# Patient Record
Sex: FEMALE | Race: WHITE | NOT HISPANIC OR LATINO | Employment: OTHER | URBAN - METROPOLITAN AREA
[De-identification: names, ages, dates, MRNs, and addresses within clinical notes are randomized per-mention and may not be internally consistent; named-entity substitution may affect disease eponyms.]

---

## 2017-04-05 PROBLEM — Z78.0 POST-MENOPAUSAL: Status: ACTIVE | Noted: 2017-04-05

## 2017-04-05 PROBLEM — G40.909 SEIZURE DISORDER: Status: ACTIVE | Noted: 2017-04-05

## 2017-04-05 PROBLEM — E78.5 OTHER AND UNSPECIFIED HYPERLIPIDEMIA: Status: ACTIVE | Noted: 2017-04-05

## 2017-04-05 PROBLEM — K91.5 POST-CHOLECYSTECTOMY SYNDROME: Status: ACTIVE | Noted: 2017-04-05

## 2017-04-10 PROBLEM — I48.91 ATRIAL FIBRILLATION WITH RAPID VENTRICULAR RESPONSE: Status: ACTIVE | Noted: 2017-04-10

## 2017-04-11 PROBLEM — I48.0 PAROXYSMAL ATRIAL FIBRILLATION: Status: ACTIVE | Noted: 2017-04-11

## 2017-04-11 PROBLEM — I48.91 ATRIAL FIBRILLATION WITH RAPID VENTRICULAR RESPONSE: Status: RESOLVED | Noted: 2017-04-10 | Resolved: 2017-04-11

## 2017-10-13 PROBLEM — E78.49 OTHER HYPERLIPIDEMIA: Status: ACTIVE | Noted: 2017-04-05

## 2019-04-28 ENCOUNTER — OFFICE VISIT (OUTPATIENT)
Dept: URGENT CARE | Facility: CLINIC | Age: 74
End: 2019-04-28
Payer: MEDICARE

## 2019-04-28 VITALS
TEMPERATURE: 97 F | HEART RATE: 80 BPM | DIASTOLIC BLOOD PRESSURE: 69 MMHG | OXYGEN SATURATION: 95 % | SYSTOLIC BLOOD PRESSURE: 111 MMHG | RESPIRATION RATE: 14 BRPM

## 2019-04-28 DIAGNOSIS — R30.0 DYSURIA: ICD-10-CM

## 2019-04-28 DIAGNOSIS — N30.01 ACUTE CYSTITIS WITH HEMATURIA: Primary | ICD-10-CM

## 2019-04-28 LAB
BILIRUB UR QL STRIP: POSITIVE
GLUCOSE UR QL STRIP: NEGATIVE
KETONES UR QL STRIP: NEGATIVE
LEUKOCYTE ESTERASE UR QL STRIP: POSITIVE
PH, POC UA: 5 (ref 5–8)
POC BLOOD, URINE: POSITIVE
POC NITRATES, URINE: NEGATIVE
PROT UR QL STRIP: POSITIVE
SP GR UR STRIP: 1.02 (ref 1–1.03)
UROBILINOGEN UR STRIP-ACNC: ABNORMAL (ref 0.1–1.1)

## 2019-04-28 PROCEDURE — 99214 PR OFFICE/OUTPT VISIT, EST, LEVL IV, 30-39 MIN: ICD-10-PCS | Mod: 25,S$GLB,, | Performed by: PHYSICIAN ASSISTANT

## 2019-04-28 PROCEDURE — 81003 POCT URINALYSIS, DIPSTICK, AUTOMATED, W/O SCOPE: ICD-10-PCS | Mod: QW,S$GLB,, | Performed by: PHYSICIAN ASSISTANT

## 2019-04-28 PROCEDURE — 3078F PR MOST RECENT DIASTOLIC BLOOD PRESSURE < 80 MM HG: ICD-10-PCS | Mod: CPTII,S$GLB,, | Performed by: PHYSICIAN ASSISTANT

## 2019-04-28 PROCEDURE — 3074F SYST BP LT 130 MM HG: CPT | Mod: CPTII,S$GLB,, | Performed by: PHYSICIAN ASSISTANT

## 2019-04-28 PROCEDURE — 3074F PR MOST RECENT SYSTOLIC BLOOD PRESSURE < 130 MM HG: ICD-10-PCS | Mod: CPTII,S$GLB,, | Performed by: PHYSICIAN ASSISTANT

## 2019-04-28 PROCEDURE — 1101F PR PT FALLS ASSESS DOC 0-1 FALLS W/OUT INJ PAST YR: ICD-10-PCS | Mod: CPTII,S$GLB,, | Performed by: PHYSICIAN ASSISTANT

## 2019-04-28 PROCEDURE — 1101F PT FALLS ASSESS-DOCD LE1/YR: CPT | Mod: CPTII,S$GLB,, | Performed by: PHYSICIAN ASSISTANT

## 2019-04-28 PROCEDURE — 81003 URINALYSIS AUTO W/O SCOPE: CPT | Mod: QW,S$GLB,, | Performed by: PHYSICIAN ASSISTANT

## 2019-04-28 PROCEDURE — 99214 OFFICE O/P EST MOD 30 MIN: CPT | Mod: 25,S$GLB,, | Performed by: PHYSICIAN ASSISTANT

## 2019-04-28 PROCEDURE — 3078F DIAST BP <80 MM HG: CPT | Mod: CPTII,S$GLB,, | Performed by: PHYSICIAN ASSISTANT

## 2019-04-28 RX ORDER — SULFAMETHOXAZOLE AND TRIMETHOPRIM 800; 160 MG/1; MG/1
1 TABLET ORAL 2 TIMES DAILY
Qty: 20 TABLET | Refills: 0 | Status: SHIPPED | OUTPATIENT
Start: 2019-04-28 | End: 2019-05-08

## 2019-04-28 RX ORDER — PHENAZOPYRIDINE HYDROCHLORIDE 200 MG/1
200 TABLET, FILM COATED ORAL 3 TIMES DAILY PRN
Qty: 30 TABLET | Refills: 0 | Status: SHIPPED | OUTPATIENT
Start: 2019-04-28 | End: 2019-08-09

## 2019-04-28 NOTE — PROGRESS NOTES
Subjective:       Patient ID: Samina Sprague is a 73 y.o. female.    Vitals:  oral temperature is 97.1 °F (36.2 °C). Her blood pressure is 111/69 and her pulse is 80. Her respiration is 14 and oxygen saturation is 95%.     Chief Complaint: Dysuria    Pt c/o dysuria, 3 days, urgency and frequency, decreased urine output, suprapubic pain, chills at night,  taking azo with no relief, pt stated she was taking Bactrim for ingrown toenail, last dose was 1 week prior to onset of symptoms,     Dysuria    This is a new problem. The current episode started in the past 7 days. The problem has been unchanged. Associated symptoms include frequency and urgency. Pertinent negatives include no chills, hematuria, nausea, vomiting or rash. Treatments tried: AZO. The treatment provided no relief.       Constitution: Negative for chills and fever.   Neck: Negative for painful lymph nodes.   Gastrointestinal: Negative for abdominal pain, nausea and vomiting.   Genitourinary: Positive for dysuria, frequency, urgency and urine decreased. Negative for hematuria, history of kidney stones, painful menstruation, irregular menstruation, missed menses, heavy menstrual bleeding, ovarian cysts, genital trauma, vaginal pain, vaginal discharge, vaginal bleeding, vaginal odor, painful intercourse, genital sore, painful ejaculation and pelvic pain.   Musculoskeletal: Negative for back pain.   Skin: Negative for rash and lesion.   Hematologic/Lymphatic: Negative for swollen lymph nodes.       Objective:      Physical Exam   Constitutional: She is oriented to person, place, and time. She appears well-developed and well-nourished.   HENT:   Head: Normocephalic and atraumatic.   Right Ear: External ear normal.   Left Ear: External ear normal.   Nose: Nose normal. No nasal deformity. No epistaxis.   Mouth/Throat: Oropharynx is clear and moist and mucous membranes are normal.   Eyes: Conjunctivae and lids are normal.   Neck: Trachea normal, normal range of  "motion and phonation normal. Neck supple.   Cardiovascular: Normal rate, regular rhythm, normal heart sounds and normal pulses.   Pulmonary/Chest: Effort normal and breath sounds normal.   Abdominal: Soft. Normal appearance and bowel sounds are normal. She exhibits no distension and no mass. There is no tenderness. There is no rigidity, no rebound, no guarding, no CVA tenderness, no tenderness at McBurney's point and negative Driscoll's sign.       Neurological: She is alert and oriented to person, place, and time.   Skin: Skin is warm, dry and intact.   Psychiatric: She has a normal mood and affect. Her speech is normal and behavior is normal. Cognition and memory are normal.   Nursing note and vitals reviewed.      Assessment:       1. Acute cystitis with hematuria    2. Dysuria        Plan:         Acute cystitis with hematuria  -     sulfamethoxazole-trimethoprim 800-160mg (BACTRIM DS) 800-160 mg Tab; Take 1 tablet by mouth 2 (two) times daily. Take with food, as directed until bottle is empty for 10 days  Dispense: 20 tablet; Refill: 0  -     phenazopyridine (PYRIDIUM) 200 MG tablet; Take 1 tablet (200 mg total) by mouth 3 (three) times daily as needed for Pain.  Dispense: 30 tablet; Refill: 0  -     Culture, Urine    Dysuria  -     POCT Urinalysis, Dipstick, Automated, W/O Scope      Results for orders placed or performed in visit on 04/28/19   POCT Urinalysis, Dipstick, Automated, W/O Scope   Result Value Ref Range    POC Blood, Urine Positive (A) Negative    POC Bilirubin, Urine Positive (A) Negative    POC Urobilinogen, Urine n 0.1 - 1.1    POC Ketones, Urine Negative Negative    POC Protein, Urine Positive (A) Negative    POC Nitrates, Urine Negative Negative    POC Glucose, Urine Negative Negative    pH, UA 5.0 5 - 8    POC Specific Gravity, Urine 1.025 1.003 - 1.029    POC Leukocytes, Urine Positive (A) Negative        Patient Instructions   UTI  A bladder infection ("cystitis" or "UTI") usually causes a " constant urge to urinate and a burning when passing urine. Urine may be cloudy, smelly or dark. There may be pain in the lower abdomen. A bladder infection occurs when bacteria from the vaginal area enter the bladder opening (urethra). This can occur from sexual intercourse, wearing tight clothing, dehydration and other factors.    Cystitis in males is not common. It may be caused by a partial blockage in the urinary system that keeps the bladder from emptying completely. This is most often related to an enlarged prostate gland.      HOME CARE:  1. Drink lots of fluids (at least 6-8 glasses a day, unless you must restrict fluids for other medical reasons). This will force the medicine into your urinary system and flush the bacteria out of your body.  2. Avoid sexual intercourse until your symptoms are gone.  3. Avoid caffeine, alcohol and spicy foods. These can irritate the bladder.  4. A bladder infection is treated with antibiotics. You may also be given Pyridium (generic = phenazopyridine) to reduce the burning sensation. This medicine will cause your urine to become a bright orange color. The orange urine may stain clothing. You may wear a pad or panty-liner to protect clothing.    PREVENTING FUTURE INFECTIONS:  1. Always wipe from front to back after a bowel movement.  2. Keep the genital area clean and dry.  3. Drink plenty of fluids each day to avoid dehydration.  4. Both sexual partners should wash before intercourse.  5. Urinate right after intercourse to flush out the bladder.  6. Wear cotton underwear and cotton-lined panty hose; avoid tight-fitting pants.  7. If you are on birth control pills and are having frequent bladder infections, discuss with your doctor.    FOLLOW UP: Return to this facility or see your doctor if ALL symptoms are not gone after three days of treatment.    GET PROMPT MEDICAL ATTENTION if any of the following occur:  Fever over 100.0ºF (37.8ºC)  No improvement by the third day of  treatment  Increasing back or abdominal pain  Repeated vomiting; unable to keep medicine down  Weakness, dizziness or fainting  Vaginal discharge  Pain, redness or swelling in the labia (outer vaginal area)       If not allergic,take tylenol (acetominophen) for fever control, chills, or body aches every 4 hours. Do not exceed 4000 mg/ day.If not allergic, take Motrin (Ibuprofen) every 4 hours for fever, chills, pain or inflammation. Do not exceed 2400 mg/day. You can alternate taking tylenol and motrin.  If you were prescribed a narcotic medication, do not drive or operate heavy equipment or machinery while taking these medications.  You must understand that you've received an Urgent Care treatment only and that you may be released before all your medical problems are known or treated. You, the patient, will arrange for follow up care as instructed.  Follow up with your PCP or specialty clinic as directed in the next 1-2 weeks if not improved or as needed.  You can call [phone] to schedule an appointment with the appropriate provider.  If your condition worsens we recommend that you receive another evaluation at the emergency room immediately or contact your primary medical clinics after hours call service to discuss your concerns.  Please return here or go to the Emergency Department for any concerns or worsening of condition.

## 2019-04-28 NOTE — PATIENT INSTRUCTIONS
"UTI  A bladder infection ("cystitis" or "UTI") usually causes a constant urge to urinate and a burning when passing urine. Urine may be cloudy, smelly or dark. There may be pain in the lower abdomen. A bladder infection occurs when bacteria from the vaginal area enter the bladder opening (urethra). This can occur from sexual intercourse, wearing tight clothing, dehydration and other factors.    Cystitis in males is not common. It may be caused by a partial blockage in the urinary system that keeps the bladder from emptying completely. This is most often related to an enlarged prostate gland.      HOME CARE:  1. Drink lots of fluids (at least 6-8 glasses a day, unless you must restrict fluids for other medical reasons). This will force the medicine into your urinary system and flush the bacteria out of your body.  2. Avoid sexual intercourse until your symptoms are gone.  3. Avoid caffeine, alcohol and spicy foods. These can irritate the bladder.  4. A bladder infection is treated with antibiotics. You may also be given Pyridium (generic = phenazopyridine) to reduce the burning sensation. This medicine will cause your urine to become a bright orange color. The orange urine may stain clothing. You may wear a pad or panty-liner to protect clothing.    PREVENTING FUTURE INFECTIONS:  1. Always wipe from front to back after a bowel movement.  2. Keep the genital area clean and dry.  3. Drink plenty of fluids each day to avoid dehydration.  4. Both sexual partners should wash before intercourse.  5. Urinate right after intercourse to flush out the bladder.  6. Wear cotton underwear and cotton-lined panty hose; avoid tight-fitting pants.  7. If you are on birth control pills and are having frequent bladder infections, discuss with your doctor.    FOLLOW UP: Return to this facility or see your doctor if ALL symptoms are not gone after three days of treatment.    GET PROMPT MEDICAL ATTENTION if any of the following " occur:  Fever over 100.0ºF (37.8ºC)  No improvement by the third day of treatment  Increasing back or abdominal pain  Repeated vomiting; unable to keep medicine down  Weakness, dizziness or fainting  Vaginal discharge  Pain, redness or swelling in the labia (outer vaginal area)       If not allergic,take tylenol (acetominophen) for fever control, chills, or body aches every 4 hours. Do not exceed 4000 mg/ day.If not allergic, take Motrin (Ibuprofen) every 4 hours for fever, chills, pain or inflammation. Do not exceed 2400 mg/day. You can alternate taking tylenol and motrin.  If you were prescribed a narcotic medication, do not drive or operate heavy equipment or machinery while taking these medications.  You must understand that you've received an Urgent Care treatment only and that you may be released before all your medical problems are known or treated. You, the patient, will arrange for follow up care as instructed.  Follow up with your PCP or specialty clinic as directed in the next 1-2 weeks if not improved or as needed.  You can call [phone] to schedule an appointment with the appropriate provider.  If your condition worsens we recommend that you receive another evaluation at the emergency room immediately or contact your primary medical clinics after hours call service to discuss your concerns.  Please return here or go to the Emergency Department for any concerns or worsening of condition.

## 2019-05-03 ENCOUNTER — TELEPHONE (OUTPATIENT)
Dept: URGENT CARE | Facility: CLINIC | Age: 74
End: 2019-05-03

## 2019-05-03 LAB
BACTERIA UR CULT: ABNORMAL
BACTERIA UR CULT: ABNORMAL
OTHER ANTIBIOTIC SUSC ISLT: ABNORMAL

## 2019-05-03 NOTE — TELEPHONE ENCOUNTER
Spoke to pt and she informed me that she is still taking her medication and starting to feel better.

## 2019-05-03 NOTE — TELEPHONE ENCOUNTER
----- Message from Noah Pantoja MD sent at 5/3/2019  8:24 AM CDT -----  Urine culture is positive for infection.  The antibiotic You replaced on should be adequate to treat this infection.  If her symptoms do not resolve, or return after treatment you should follow up with your PCP or specialist.

## 2019-08-08 PROBLEM — Z78.9 STATIN INTOLERANCE: Status: ACTIVE | Noted: 2019-08-08

## 2021-01-22 ENCOUNTER — PATIENT MESSAGE (OUTPATIENT)
Dept: ADMINISTRATIVE | Facility: OTHER | Age: 76
End: 2021-01-22

## 2021-02-21 ENCOUNTER — OFFICE VISIT (OUTPATIENT)
Dept: URGENT CARE | Facility: CLINIC | Age: 76
End: 2021-02-21
Payer: MEDICARE

## 2021-02-21 VITALS
HEART RATE: 91 BPM | OXYGEN SATURATION: 97 % | TEMPERATURE: 98 F | WEIGHT: 152 LBS | SYSTOLIC BLOOD PRESSURE: 120 MMHG | BODY MASS INDEX: 27.97 KG/M2 | DIASTOLIC BLOOD PRESSURE: 83 MMHG | HEIGHT: 62 IN | RESPIRATION RATE: 16 BRPM

## 2021-02-21 DIAGNOSIS — J02.9 SORE THROAT: ICD-10-CM

## 2021-02-21 DIAGNOSIS — H92.02 EAR PAIN, LEFT: Primary | ICD-10-CM

## 2021-02-21 LAB
CTP QC/QA: YES
MOLECULAR STREP A: NEGATIVE

## 2021-02-21 PROCEDURE — 99213 OFFICE O/P EST LOW 20 MIN: CPT | Mod: S$GLB,,, | Performed by: NURSE PRACTITIONER

## 2021-02-21 PROCEDURE — 3008F PR BODY MASS INDEX (BMI) DOCUMENTED: ICD-10-PCS | Mod: CPTII,S$GLB,, | Performed by: NURSE PRACTITIONER

## 2021-02-21 PROCEDURE — 87651 POCT STREP A MOLECULAR: ICD-10-PCS | Mod: QW,S$GLB,, | Performed by: NURSE PRACTITIONER

## 2021-02-21 PROCEDURE — 99213 PR OFFICE/OUTPT VISIT, EST, LEVL III, 20-29 MIN: ICD-10-PCS | Mod: S$GLB,,, | Performed by: NURSE PRACTITIONER

## 2021-02-21 PROCEDURE — 87651 STREP A DNA AMP PROBE: CPT | Mod: QW,S$GLB,, | Performed by: NURSE PRACTITIONER

## 2021-02-21 PROCEDURE — 3008F BODY MASS INDEX DOCD: CPT | Mod: CPTII,S$GLB,, | Performed by: NURSE PRACTITIONER

## 2021-02-21 RX ORDER — FLUTICASONE PROPIONATE 50 MCG
1 SPRAY, SUSPENSION (ML) NASAL DAILY
Qty: 9.9 ML | Refills: 0 | Status: SHIPPED | OUTPATIENT
Start: 2021-02-21 | End: 2022-05-02

## 2021-09-17 PROBLEM — E03.4 HYPOTHYROIDISM DUE TO ACQUIRED ATROPHY OF THYROID: Status: ACTIVE | Noted: 2021-09-17

## 2022-06-18 PROBLEM — Z86.79 S/P ABLATION OF ATRIAL FIBRILLATION: Status: ACTIVE | Noted: 2022-06-18

## 2022-06-18 PROBLEM — Z98.890 S/P ABLATION OF ATRIAL FIBRILLATION: Status: ACTIVE | Noted: 2022-06-18

## 2022-09-29 PROBLEM — Z95.818 STATUS POST PLACEMENT OF IMPLANTABLE LOOP RECORDER: Status: ACTIVE | Noted: 2022-09-29

## 2022-11-28 ENCOUNTER — CLINICAL SUPPORT (OUTPATIENT)
Dept: AUDIOLOGY | Facility: CLINIC | Age: 77
End: 2022-11-28
Payer: MEDICARE

## 2022-11-28 ENCOUNTER — OFFICE VISIT (OUTPATIENT)
Dept: OTOLARYNGOLOGY | Facility: CLINIC | Age: 77
End: 2022-11-28
Payer: MEDICARE

## 2022-11-28 VITALS — HEIGHT: 62 IN | WEIGHT: 147.69 LBS | BODY MASS INDEX: 27.18 KG/M2 | TEMPERATURE: 99 F

## 2022-11-28 DIAGNOSIS — H69.03 PATULOUS EUSTACHIAN TUBE OF BOTH EARS: ICD-10-CM

## 2022-11-28 DIAGNOSIS — H90.3 ASYMMETRIC SNHL (SENSORINEURAL HEARING LOSS): Primary | ICD-10-CM

## 2022-11-28 DIAGNOSIS — H90.3 BILATERAL SENSORINEURAL HEARING LOSS: Primary | ICD-10-CM

## 2022-11-28 DIAGNOSIS — H93.292 IMPAIRED AUDITORY DISCRIMINATION, LEFT: ICD-10-CM

## 2022-11-28 DIAGNOSIS — H92.09 OTALGIA, UNSPECIFIED LATERALITY: ICD-10-CM

## 2022-11-28 DIAGNOSIS — H93.13 BILATERAL TINNITUS: ICD-10-CM

## 2022-11-28 DIAGNOSIS — H93.13 TINNITUS OF BOTH EARS: ICD-10-CM

## 2022-11-28 DIAGNOSIS — Z96.22 PATENT TYMPANOSTOMY TUBE: ICD-10-CM

## 2022-11-28 PROCEDURE — 92557 PR COMPREHENSIVE HEARING TEST: ICD-10-PCS | Mod: S$GLB,,, | Performed by: AUDIOLOGIST

## 2022-11-28 PROCEDURE — 1160F PR REVIEW ALL MEDS BY PRESCRIBER/CLIN PHARMACIST DOCUMENTED: ICD-10-PCS | Mod: CPTII,S$GLB,, | Performed by: NURSE PRACTITIONER

## 2022-11-28 PROCEDURE — 99214 OFFICE O/P EST MOD 30 MIN: CPT | Mod: S$GLB,,, | Performed by: NURSE PRACTITIONER

## 2022-11-28 PROCEDURE — 99999 PR PBB SHADOW E&M-EST. PATIENT-LVL IV: CPT | Mod: PBBFAC,,, | Performed by: NURSE PRACTITIONER

## 2022-11-28 PROCEDURE — 1125F AMNT PAIN NOTED PAIN PRSNT: CPT | Mod: CPTII,S$GLB,, | Performed by: NURSE PRACTITIONER

## 2022-11-28 PROCEDURE — 3288F FALL RISK ASSESSMENT DOCD: CPT | Mod: CPTII,S$GLB,, | Performed by: NURSE PRACTITIONER

## 2022-11-28 PROCEDURE — 92567 PR TYMPA2METRY: ICD-10-PCS | Mod: S$GLB,,, | Performed by: AUDIOLOGIST

## 2022-11-28 PROCEDURE — 92557 COMPREHENSIVE HEARING TEST: CPT | Mod: S$GLB,,, | Performed by: AUDIOLOGIST

## 2022-11-28 PROCEDURE — 92567 TYMPANOMETRY: CPT | Mod: S$GLB,,, | Performed by: AUDIOLOGIST

## 2022-11-28 PROCEDURE — 99214 PR OFFICE/OUTPT VISIT, EST, LEVL IV, 30-39 MIN: ICD-10-PCS | Mod: S$GLB,,, | Performed by: NURSE PRACTITIONER

## 2022-11-28 PROCEDURE — 1125F PR PAIN SEVERITY QUANTIFIED, PAIN PRESENT: ICD-10-PCS | Mod: CPTII,S$GLB,, | Performed by: NURSE PRACTITIONER

## 2022-11-28 PROCEDURE — 1159F PR MEDICATION LIST DOCUMENTED IN MEDICAL RECORD: ICD-10-PCS | Mod: CPTII,S$GLB,, | Performed by: NURSE PRACTITIONER

## 2022-11-28 PROCEDURE — 1160F RVW MEDS BY RX/DR IN RCRD: CPT | Mod: CPTII,S$GLB,, | Performed by: NURSE PRACTITIONER

## 2022-11-28 PROCEDURE — 1159F MED LIST DOCD IN RCRD: CPT | Mod: CPTII,S$GLB,, | Performed by: NURSE PRACTITIONER

## 2022-11-28 PROCEDURE — 3288F PR FALLS RISK ASSESSMENT DOCUMENTED: ICD-10-PCS | Mod: CPTII,S$GLB,, | Performed by: NURSE PRACTITIONER

## 2022-11-28 PROCEDURE — 1101F PR PT FALLS ASSESS DOC 0-1 FALLS W/OUT INJ PAST YR: ICD-10-PCS | Mod: CPTII,S$GLB,, | Performed by: NURSE PRACTITIONER

## 2022-11-28 PROCEDURE — 1101F PT FALLS ASSESS-DOCD LE1/YR: CPT | Mod: CPTII,S$GLB,, | Performed by: NURSE PRACTITIONER

## 2022-11-28 PROCEDURE — 99999 PR PBB SHADOW E&M-EST. PATIENT-LVL IV: ICD-10-PCS | Mod: PBBFAC,,, | Performed by: NURSE PRACTITIONER

## 2022-11-28 NOTE — PATIENT INSTRUCTIONS
Tinnitus (Ringing in the Ears)  Tinnitus is the term for a noise in your ear not caused by an outside sound. The noise might be a ringing, buzzing, hissing, roaring. It can vary in pitch and may be soft or quite loud. For some people, tinnitus is a minor nuisance. But for others, the noise can make it hard to hear, work, and even sleep. When tinnitus can't be cured, a number of treatments may offer relief.  What causes tinnitus?  Loud noises, hearing loss, and ear wax can cause tinnitus. So can certain medicines. Large amounts of aspirin or caffeine are sometimes to blame. In many cases, the exact cause of tinnitus is unknown.  How is tinnitus treated?  Identifying and removing the cause is the best way to treat tinnitus. For that reason, your healthcare provider may refer you to an otolaryngologist (ear, nose, and throat doctor). Your hearing may also be checked by an audiologist (hearing specialist). If you have hearing loss, wearing a hearing aid may help your tinnitus. When the cause can't be found, the tinnitus itself may be treated. Some of the treatments are listed below, and your healthcare provider can tell you more about them:  Avoid exposure to loud sounds, which will exacerbate tinnitus.  Wear ear protection around loud noises.  Get your blood pressure check regularly.  If it is high, see your doctor.  Check with your PCP whether labs can be done to check for anemia and hyperthyroid, as these can worsen tinnitus.   Decrease salt intake.  Avoid stimulants such as caffeine and tobacco.  Exercise daily to improve circulation. Poor circulation worsens tinnitus.   Avoid sleep deprivation. Sleep deprivation and insomnia can worsen tinnitus. Get adequate rest.  Reduce aspirin use, if possible. Aspirin as well as NSAIDs and narcotic pain relievers can exacerbate tinnitus.   Stop worrying about the noise.  Stress worsens tinnitus. Recognize the noise as an annoyance and learn to ignore it as much as possible.  Patients with higher rates of anxiety, depression, concentration, or problems sleeping may need to discuss taking something for anxiety or depression with their primary care provider.     Consider a trial of lipoflavonoids, slow-release niacin, or Arches' Tinnitus Formula (over-the-counter).  Purchase a white noise machine to mask tinnitus.  The i[employer] Tinnitus Relief ari on your smart phone uses a combination of sounds and relaxing exercises that aim to distract your brain from focusing on tinnitus. Over time the brain learns to focus less on the tinnitus.   When no underlying pathology can be identified, an audiogram is needed to screen for hearing loss. If hearing loss is noted, hearing aids with masking technology are recommended to help relieve tinnitus.   Maskers are small devices that look like hearing aids. They emit a pleasant sound that helps cover up the ringing in your ears, similar to the technology used in noise-cancelling headphones. Hearing aids and maskers are sometimes used together.  Cognitive Behavioral Therapy (CBT), otherwise known as Tinnitus Retraining Therapy (TRT), can be done by either an audiologist or a cognitive behavioral therapist who is certified in TRT. Tinnitus retraining therapy combines biofeedback, counseling and maskers.   Medicines that treat anxiety and depression may ease tinnitus in some people.  Hypnosis or relaxation therapy may help noise seem less severe.    First-line treatment for tinnitus:  Elimination of exacerbating factors such as elevated blood pressure, high sodium intake, caffeine/stimulants, sleep deprivation/insomnia, certain medications, aspirin, exposure to loud sounds, etc. White noise is recommended as first-line treatment.    Second-line treatment for tinnitus:  Maskers (with or without the use of a hearing aid depending on the outcome of your hearing test) and/or Cognitive Behavior Therapy (Tinnitus Retraining Therapy).  To find an audiologist or  Cognitive Behavioral Therapist in your area who is certified in Tinnitus Retraining Therapy, you must contact the American Tinnitus Association at 1-691.608.5795 or www.lambert.org.    For more information  American Speech-Hearing-Language Association [phone] www.rigoberto.org  American Tinnitus Association [phone] www.lambert.org  National [employer] on Deafness and other Communication Disorders [phone] www.nidcd.nih.gov

## 2022-11-28 NOTE — PROGRESS NOTES
Samina Sprague was seen 11/28/2022 for an audiological evaluation.     Pt reported abnormal auditory sound of her own voice, bilateral tinnitus, difficulty hearing speech clearly and intermittent ear/jaw pain.     Otoscopy revealed clear view of both external ear canals and tympanic membranes.  No obstructive cerumen present in either ear canal.   PE tube visualized in left TM.     Audiogram results revealed a mild-to-moderate sensorineural hearing loss for the right ear and a mild-to-severe sensorineural hearing loss for the left ear.    Speech Reception Thresholds were  20 dBHL for the right ear and 35 dBHL for the left ear.    Word recognition scores were excellent for the right ear and fair for the left ear.   Tympanograms were Type A for the right ear and unable to obtain for the left ear.     Audiogram results were reviewed in detail with patient and all questions were answered. Results will be reviewed by ENT at the completion of this note.     Recommend ENT consult due to asymmetry, binaural amplification pending medical clearance, hearing protection for all loud sounds and annual audiogram to monitor hearing loss.

## 2023-01-24 ENCOUNTER — OFFICE VISIT (OUTPATIENT)
Dept: OTOLARYNGOLOGY | Facility: CLINIC | Age: 78
End: 2023-01-24
Payer: MEDICARE

## 2023-01-24 VITALS — BODY MASS INDEX: 27.95 KG/M2 | WEIGHT: 151.88 LBS | HEIGHT: 62 IN

## 2023-01-24 DIAGNOSIS — H90.3 BILATERAL SENSORINEURAL HEARING LOSS: ICD-10-CM

## 2023-01-24 DIAGNOSIS — H93.13 TINNITUS OF BOTH EARS: ICD-10-CM

## 2023-01-24 DIAGNOSIS — H69.03 PATULOUS EUSTACHIAN TUBE OF BOTH EARS: Primary | ICD-10-CM

## 2023-01-24 PROCEDURE — 1101F PT FALLS ASSESS-DOCD LE1/YR: CPT | Mod: CPTII,S$GLB,, | Performed by: OTOLARYNGOLOGY

## 2023-01-24 PROCEDURE — 1159F PR MEDICATION LIST DOCUMENTED IN MEDICAL RECORD: ICD-10-PCS | Mod: CPTII,S$GLB,, | Performed by: OTOLARYNGOLOGY

## 2023-01-24 PROCEDURE — 1126F PR PAIN SEVERITY QUANTIFIED, NO PAIN PRESENT: ICD-10-PCS | Mod: CPTII,S$GLB,, | Performed by: OTOLARYNGOLOGY

## 2023-01-24 PROCEDURE — 1159F MED LIST DOCD IN RCRD: CPT | Mod: CPTII,S$GLB,, | Performed by: OTOLARYNGOLOGY

## 2023-01-24 PROCEDURE — 99999 PR PBB SHADOW E&M-EST. PATIENT-LVL III: ICD-10-PCS | Mod: PBBFAC,,, | Performed by: OTOLARYNGOLOGY

## 2023-01-24 PROCEDURE — 1101F PR PT FALLS ASSESS DOC 0-1 FALLS W/OUT INJ PAST YR: ICD-10-PCS | Mod: CPTII,S$GLB,, | Performed by: OTOLARYNGOLOGY

## 2023-01-24 PROCEDURE — 99214 PR OFFICE/OUTPT VISIT, EST, LEVL IV, 30-39 MIN: ICD-10-PCS | Mod: S$GLB,,, | Performed by: OTOLARYNGOLOGY

## 2023-01-24 PROCEDURE — 99214 OFFICE O/P EST MOD 30 MIN: CPT | Mod: S$GLB,,, | Performed by: OTOLARYNGOLOGY

## 2023-01-24 PROCEDURE — 99999 PR PBB SHADOW E&M-EST. PATIENT-LVL III: CPT | Mod: PBBFAC,,, | Performed by: OTOLARYNGOLOGY

## 2023-01-24 PROCEDURE — 3288F PR FALLS RISK ASSESSMENT DOCUMENTED: ICD-10-PCS | Mod: CPTII,S$GLB,, | Performed by: OTOLARYNGOLOGY

## 2023-01-24 PROCEDURE — 3288F FALL RISK ASSESSMENT DOCD: CPT | Mod: CPTII,S$GLB,, | Performed by: OTOLARYNGOLOGY

## 2023-01-24 PROCEDURE — 1126F AMNT PAIN NOTED NONE PRSNT: CPT | Mod: CPTII,S$GLB,, | Performed by: OTOLARYNGOLOGY

## 2023-01-24 NOTE — PATIENT INSTRUCTIONS
Try the PATULEND drop  Will see you back in 2 months  Keep track of left and right side and whether right side is more symptomatic than the left. If so, we can consider trialing a small incision in the ear drum to see if a tube would reduce your symptoms

## 2023-01-24 NOTE — PROGRESS NOTES
Subjective:       Patient ID: Samina Sprague is a 77 y.o. female.    Chief Complaint: Consult (tubes) and Otalgia (Dull pain )      Samina is here for follow-up of suspect patulous eustachian tubes.   Has been present for months.  Stopped flonase. No weight changes.  Symptoms are notably worse on right but present on left as well. + autophony and audible respirations    Has seen [facility] in the past for L OME and had PE tube on left which did not help. At that time, she was not having these symptoms.     Patient validated questionnaires (if applicable):      %       No flowsheet data found.  No flowsheet data found.  No flowsheet data found.         Review of Systems   Constitutional: Negative for activity change and appetite change.   Respiratory: Negative for difficulty breathing and wheezing   Cardiovascular: Negative for chest pain.      Objective:        Constitutional:   She is oriented to person, place, and time. She appears well-developed and well-nourished. She appears alert. She is active.     Ears:    Right Ear: No middle ear effusion.   Left Ear:  No middle ear effusion. A PE tube (patent) is seen.     Mouth/Throat  Lips, teeth, and gums normal.     Pulmonary/Chest:   Effort normal. No accessory muscle usage. No respiratory distress.     Psychiatric:   She has a normal mood and affect.     Neurological:   She is alert and oriented to person, place, and time.       Tests / Results:  none    Assessment:       1. Patulous eustachian tube of both ears    2. Tinnitus of both ears    3. Bilateral sensorineural hearing loss          Plan:       Discussed Patulous ET at length  She has not tried PatulEND. Recommend  Symptoms not as bad on left, unsure if related to PE tube mass loading of now. Monitor degree of symptoms R vs. L and if desired, can trial myringotomy on the right to see if tube may benefit  RTC 2 mos

## 2023-01-30 ENCOUNTER — TELEPHONE (OUTPATIENT)
Dept: OTOLARYNGOLOGY | Facility: CLINIC | Age: 78
End: 2023-01-30
Payer: MEDICARE

## 2023-01-30 NOTE — TELEPHONE ENCOUNTER
S/w pt and she states that she went online to order the medication you recommended, PatulEND. However, d/t to the cost for meds and shipping she will not be able to afford, $102. Pt requesting something else that is more affordable, please advise.l

## 2023-01-30 NOTE — TELEPHONE ENCOUNTER
----- Message from Elinor Adame sent at 1/30/2023  1:07 PM CST -----  Regarding: medication  Contact: Patient  Type: Needs Medical Advice  Who Called:  Patient  Symptoms (please be specific):    How long has patient had these symptoms:    Pharmacy name and phone #:    Best Call Back Number: [phone]    Additional Information: Patient is reaching out regarding a medication that you suggested and needs to let you know it is too expensive and needs advice on something different. Thanks!

## 2023-01-31 NOTE — TELEPHONE ENCOUNTER
I s/w pt and relayed what you said. She said that she will figure out something if you didn't have any other recommendations that she could try that would be a little more affordable. She said that if she had any other questions she would call back.

## 2023-05-08 PROBLEM — M19.042 ARTHRITIS OF LEFT HAND: Status: ACTIVE | Noted: 2023-05-08

## 2023-05-08 PROBLEM — H69.00 PATULOUS EUSTACHIAN TUBE: Status: ACTIVE | Noted: 2023-05-08

## 2023-06-21 PROBLEM — H93.13 TINNITUS OF BOTH EARS: Status: ACTIVE | Noted: 2023-06-21

## 2023-07-18 ENCOUNTER — OFFICE VISIT (OUTPATIENT)
Dept: OTOLARYNGOLOGY | Facility: CLINIC | Age: 78
End: 2023-07-18
Payer: MEDICARE

## 2023-07-18 VITALS — WEIGHT: 150.13 LBS | HEIGHT: 62 IN | BODY MASS INDEX: 27.63 KG/M2

## 2023-07-18 DIAGNOSIS — H69.03 PATULOUS EUSTACHIAN TUBE OF BOTH EARS: Primary | ICD-10-CM

## 2023-07-18 DIAGNOSIS — H90.3 BILATERAL SENSORINEURAL HEARING LOSS: ICD-10-CM

## 2023-07-18 DIAGNOSIS — H61.22 LEFT EAR IMPACTED CERUMEN: ICD-10-CM

## 2023-07-18 PROCEDURE — 1126F AMNT PAIN NOTED NONE PRSNT: CPT | Mod: CPTII,S$GLB,, | Performed by: OTOLARYNGOLOGY

## 2023-07-18 PROCEDURE — 3288F FALL RISK ASSESSMENT DOCD: CPT | Mod: CPTII,S$GLB,, | Performed by: OTOLARYNGOLOGY

## 2023-07-18 PROCEDURE — 99999 PR PBB SHADOW E&M-EST. PATIENT-LVL III: ICD-10-PCS | Mod: PBBFAC,,, | Performed by: OTOLARYNGOLOGY

## 2023-07-18 PROCEDURE — 99999 PR PBB SHADOW E&M-EST. PATIENT-LVL III: CPT | Mod: PBBFAC,,, | Performed by: OTOLARYNGOLOGY

## 2023-07-18 PROCEDURE — 1159F MED LIST DOCD IN RCRD: CPT | Mod: CPTII,S$GLB,, | Performed by: OTOLARYNGOLOGY

## 2023-07-18 PROCEDURE — 1160F PR REVIEW ALL MEDS BY PRESCRIBER/CLIN PHARMACIST DOCUMENTED: ICD-10-PCS | Mod: CPTII,S$GLB,, | Performed by: OTOLARYNGOLOGY

## 2023-07-18 PROCEDURE — 99214 PR OFFICE/OUTPT VISIT, EST, LEVL IV, 30-39 MIN: ICD-10-PCS | Mod: 25,S$GLB,, | Performed by: OTOLARYNGOLOGY

## 2023-07-18 PROCEDURE — 1160F RVW MEDS BY RX/DR IN RCRD: CPT | Mod: CPTII,S$GLB,, | Performed by: OTOLARYNGOLOGY

## 2023-07-18 PROCEDURE — 69210 REMOVE IMPACTED EAR WAX UNI: CPT | Mod: S$GLB,,, | Performed by: OTOLARYNGOLOGY

## 2023-07-18 PROCEDURE — 1159F PR MEDICATION LIST DOCUMENTED IN MEDICAL RECORD: ICD-10-PCS | Mod: CPTII,S$GLB,, | Performed by: OTOLARYNGOLOGY

## 2023-07-18 PROCEDURE — 1101F PR PT FALLS ASSESS DOC 0-1 FALLS W/OUT INJ PAST YR: ICD-10-PCS | Mod: CPTII,S$GLB,, | Performed by: OTOLARYNGOLOGY

## 2023-07-18 PROCEDURE — 1126F PR PAIN SEVERITY QUANTIFIED, NO PAIN PRESENT: ICD-10-PCS | Mod: CPTII,S$GLB,, | Performed by: OTOLARYNGOLOGY

## 2023-07-18 PROCEDURE — 1101F PT FALLS ASSESS-DOCD LE1/YR: CPT | Mod: CPTII,S$GLB,, | Performed by: OTOLARYNGOLOGY

## 2023-07-18 PROCEDURE — 3288F PR FALLS RISK ASSESSMENT DOCUMENTED: ICD-10-PCS | Mod: CPTII,S$GLB,, | Performed by: OTOLARYNGOLOGY

## 2023-07-18 PROCEDURE — 69210 PR REMOVAL IMPACTED CERUMEN REQUIRING INSTRUMENTATION, UNILATERAL: ICD-10-PCS | Mod: S$GLB,,, | Performed by: OTOLARYNGOLOGY

## 2023-07-18 PROCEDURE — 99214 OFFICE O/P EST MOD 30 MIN: CPT | Mod: 25,S$GLB,, | Performed by: OTOLARYNGOLOGY

## 2023-07-18 NOTE — PROGRESS NOTES
Subjective:       Patient ID: Samina Sprague is a 78 y.o. female.    Chief Complaint: Ear Fullness    Samina is here for follow-up of suspected patulous eustachian tube.   The last visit she was more symptomatic on the right (without a tube.) Over the past few months, she has noted worsening on the left.     She has autophony, audible respirations, and ear fullness with subjective hearing loss.     Patient validated questionnaires (if applicable):      %       No flowsheet data found.  No flowsheet data found.  No flowsheet data found.         Review of Systems   Constitutional: Negative for activity change and appetite change.   Respiratory: Negative for difficulty breathing and wheezing   Cardiovascular: Negative for chest pain.      Objective:        Constitutional:   Vital signs are normal. She appears well-developed and well-nourished.     Head:  Normocephalic and atraumatic.     Ears:  Hearing normal to normal and whispered voice; external ear normal without scars, lesions, or masses; ear canal, tympanic membrane, and middle ear normal..   Left cerumen impaction and extruded tube    Removed as below    Excursion of L>R TM with nasal breathing    Nose:  Nose normal including turbinates, nasal mucosa, sinuses and nasal septum.     Mouth/Throat  Oropharynx clear and moist without lesions or asymmetry.     Neck:  Neck normal without thyromegaly masses, asymmetry, normal tracheal structure, crepitus, and tenderness.       Tests / Results:  Procedure: bilateral microscopy with removal of cerumen  Reason: cerumen impaction, inability to visualize the tympanic membrane  Details: microscope used to obtain view of ear canals bilaterally cerumen removed with the use curette, suction, and alligator forceps  Findings: AD: none, AS: moderate dry, thick ceurmen and extruded tube.  Patient tolerated procedure well.    Assessment:       1. Patulous eustachian tube of both ears    2. Bilateral sensorineural hearing loss    3. Left  ear impacted cerumen          Plan:         Continue symptoms. Tube on left is now out.  She has not tried PatulEND, recommended trial of this.  We discussed procedural management including mass loading, tympanostomy tube and r/b/a.    After discussing the issue again, she elected to trial mass loading of TM and I placed a small steri strip on the posterior TM. She did feel like there might be improvement of symptoms. If we get some control with this, we can do this periodically vs. Replace tube.   RTC 6 weeks

## 2023-08-04 PROBLEM — C44.300 SKIN CANCER OF FACE: Status: ACTIVE | Noted: 2023-08-04

## 2023-09-20 ENCOUNTER — TELEPHONE (OUTPATIENT)
Dept: PHARMACY | Facility: CLINIC | Age: 78
End: 2023-09-20
Payer: MEDICARE

## 2023-09-20 NOTE — TELEPHONE ENCOUNTER
I have confirmed with Ms. Sprague by PHONE that she does not need prescription assistance at this time. Ms. Sprague stated the medication was mentioned, but was not prescribed the medication. She was under the impression the medication wouldn't be prescribed until after lab work scheduled in December was completed.  Requested office to call for clarification

## 2024-02-21 ENCOUNTER — OFFICE VISIT (OUTPATIENT)
Dept: URGENT CARE | Facility: CLINIC | Age: 79
End: 2024-02-21
Payer: MEDICARE

## 2024-02-21 VITALS
RESPIRATION RATE: 16 BRPM | OXYGEN SATURATION: 96 % | HEART RATE: 90 BPM | TEMPERATURE: 98 F | SYSTOLIC BLOOD PRESSURE: 140 MMHG | DIASTOLIC BLOOD PRESSURE: 80 MMHG

## 2024-02-21 DIAGNOSIS — R31.9 URINARY TRACT INFECTION WITH HEMATURIA, SITE UNSPECIFIED: Primary | ICD-10-CM

## 2024-02-21 DIAGNOSIS — N39.0 URINARY TRACT INFECTION WITH HEMATURIA, SITE UNSPECIFIED: Primary | ICD-10-CM

## 2024-02-21 DIAGNOSIS — R81 GLUCOSURIA: ICD-10-CM

## 2024-02-21 DIAGNOSIS — E11.9 TYPE 2 DIABETES MELLITUS WITHOUT COMPLICATION, WITHOUT LONG-TERM CURRENT USE OF INSULIN: ICD-10-CM

## 2024-02-21 DIAGNOSIS — R30.0 DYSURIA: ICD-10-CM

## 2024-02-21 LAB
BILIRUB UR QL STRIP: NEGATIVE
COLOR UR: ABNORMAL
GLUCOSE UR QL STRIP: POSITIVE
KETONES UR QL STRIP: NEGATIVE
LEUKOCYTE ESTERASE UR QL STRIP: POSITIVE
PH, POC UA: 5 (ref 5–8)
POC BLOOD, URINE: POSITIVE
POC NITRATES, URINE: NEGATIVE
PROT UR QL STRIP: POSITIVE
SP GR UR STRIP: 1.02 (ref 1–1.03)
UROBILINOGEN UR STRIP-ACNC: NORMAL (ref 0.1–1.1)

## 2024-02-21 PROCEDURE — 87186 SC STD MICRODIL/AGAR DIL: CPT | Performed by: EMERGENCY MEDICINE

## 2024-02-21 PROCEDURE — 87086 URINE CULTURE/COLONY COUNT: CPT | Performed by: EMERGENCY MEDICINE

## 2024-02-21 PROCEDURE — 87077 CULTURE AEROBIC IDENTIFY: CPT | Performed by: EMERGENCY MEDICINE

## 2024-02-21 PROCEDURE — 87088 URINE BACTERIA CULTURE: CPT | Performed by: EMERGENCY MEDICINE

## 2024-02-21 PROCEDURE — 99213 OFFICE O/P EST LOW 20 MIN: CPT | Mod: S$GLB,,, | Performed by: EMERGENCY MEDICINE

## 2024-02-21 PROCEDURE — 81003 URINALYSIS AUTO W/O SCOPE: CPT | Mod: QW,S$GLB,, | Performed by: EMERGENCY MEDICINE

## 2024-02-21 RX ORDER — CEFDINIR 300 MG/1
300 CAPSULE ORAL 2 TIMES DAILY
Qty: 14 CAPSULE | Refills: 0 | Status: SHIPPED | OUTPATIENT
Start: 2024-02-21 | End: 2024-02-28

## 2024-02-21 NOTE — PATIENT INSTRUCTIONS
Increase water intake.     Monitor blood sugar closely and keep tight glucose control.     While on antibiotics, take a daily probiotic such as Align or Culturelle or eat yogurt with live cultures (Activia is one example). This will lessen the chances of stomach upset.     Recheck for any fever, confusion, nausea/vomiting or new concerns.

## 2024-02-21 NOTE — PROGRESS NOTES
Subjective:      Patient ID: Samina Sprague is a 78 y.o. female.    Vitals:  temperature is 97.6 °F (36.4 °C). Her blood pressure is 140/80 (abnormal) and her pulse is 90. Her respiration is 16 and oxygen saturation is 96%.     Chief Complaint: Dysuria    Pt presents with poss UTI-burning, urgency , frequency x 2 days. No back pain. No N/V. No fever/chills. No h/o recurrent UTI.     Has known DM - has been having elevated sugars and PCP just double dose of her PO med. Blood sugar 160 at home this AM.    Dysuria   This is a new problem. The current episode started acute onset. The problem occurs every urination. The problem has been gradually worsening. The quality of the pain is described as aching and burning. The patient is experiencing no pain. There has been no fever. She is Not sexually active. There is No history of pyelonephritis. Associated symptoms include frequency and urgency. Pertinent negatives include no chills, discharge, hematuria, nausea, vomiting, constipation or rash. She has tried nothing for the symptoms. Her past medical history is significant for diabetes mellitus and recurrent UTIs.       Constitution: Negative for chills, sweating, fatigue, fever and unexpected weight change.   HENT:  Negative for ear pain, drooling, congestion, sore throat, trouble swallowing and voice change.    Neck: Negative for neck pain, neck stiffness, painful lymph nodes and neck swelling.   Cardiovascular:  Negative for chest pain, leg swelling, palpitations, sob on exertion and passing out.   Eyes:  Negative for eye pain, eye redness, photophobia, double vision and blurred vision.   Respiratory:  Negative for chest tightness, cough, sputum production, bloody sputum, stridor and wheezing.    Gastrointestinal:  Negative for abdominal pain, abdominal bloating, nausea, vomiting, constipation, diarrhea and heartburn.   Genitourinary:  Positive for dysuria, frequency and urgency. Negative for hematuria.   Musculoskeletal:   Negative for joint pain, joint swelling, back pain, muscle cramps and muscle ache.   Skin:  Negative for rash and hives.   Allergic/Immunologic: Negative for hives and itching.   Neurological:  Negative for dizziness, light-headedness, passing out, loss of balance, headaches, altered mental status, loss of consciousness and seizures.   Hematologic/Lymphatic: Negative for swollen lymph nodes.   Psychiatric/Behavioral:  Negative for altered mental status and nervous/anxious. The patient is not nervous/anxious.       Objective:     Physical Exam   Constitutional: She is oriented to person, place, and time. She appears well-developed. She is cooperative.  Non-toxic appearance. She does not appear ill. No distress.   HENT:   Head: Normocephalic and atraumatic.   Ears:   Right Ear: Hearing normal.   Left Ear: Hearing normal.   Nose: No mucosal edema or nasal deformity. No epistaxis. Right sinus exhibits no maxillary sinus tenderness and no frontal sinus tenderness. Left sinus exhibits no maxillary sinus tenderness and no frontal sinus tenderness.   Mouth/Throat: Uvula is midline, oropharynx is clear and moist and mucous membranes are normal. Mucous membranes are moist. No trismus in the jaw. Normal dentition. No uvula swelling. No posterior oropharyngeal edema.   Eyes: Conjunctivae and lids are normal. No scleral icterus.   Neck: Trachea normal and phonation normal. Neck supple. No edema present. No erythema present. No neck rigidity present.   Cardiovascular: Normal rate, regular rhythm, normal heart sounds and normal pulses.   Pulmonary/Chest: Effort normal and breath sounds normal. No respiratory distress. She has no decreased breath sounds. She has no rhonchi.   Abdominal: Normal appearance and bowel sounds are normal. She exhibits no distension and no mass. Soft. There is no abdominal tenderness. There is no left CVA tenderness and no right CVA tenderness.   Musculoskeletal: Normal range of motion.         General:  No deformity. Normal range of motion.   Neurological: She is alert and oriented to person, place, and time. She has normal strength. She exhibits normal muscle tone. Coordination normal.   Skin: Skin is warm, dry, intact, not diaphoretic and not pale.   Psychiatric: Her speech is normal and behavior is normal. Judgment and thought content normal.   Nursing note and vitals reviewed.    Results for orders placed or performed in visit on 02/21/24   POCT Urinalysis, Dipstick, Automated, W/O Scope   Result Value Ref Range    POC Blood, Urine Positive (A) Negative    POC Bilirubin, Urine Negative Negative    POC Urobilinogen, Urine normal 0.1 - 1.1    POC Ketones, Urine Negative Negative    POC Protein, Urine Positive (A) Negative    POC Nitrates, Urine Negative Negative    POC Glucose, Urine Positive (A) Negative    pH, UA 5.0 5 - 8    POC Specific Gravity, Urine 1.020 1.003 - 1.029    POC Leukocytes, Urine Positive (A) Negative    Color, UA Light Yellow Light Yellow, Yellow        Assessment:     1. Urinary tract infection with hematuria, site unspecified    2. Dysuria    3. Glucosuria        Plan:     Home blood sugar 160 this AM - has glucosuria here - do not need to recheck here.     Cefdinir given for broader spectrum  and urine cx sent due to age and risk factors for more severe infection including DM with poor glucose control.     Urinary tract infection with hematuria, site unspecified  -     CULTURE, URINE  -     cefdinir (OMNICEF) 300 MG capsule; Take 1 capsule (300 mg total) by mouth 2 (two) times daily. for 7 days  Dispense: 14 capsule; Refill: 0    Dysuria  -     POCT Urinalysis, Dipstick, Automated, W/O Scope    Glucosuria      Patient Instructions   Increase water intake.     Monitor blood sugar closely and keep tight glucose control.     While on antibiotics, take a daily probiotic such as Align or Culturelle or eat yogurt with live cultures (Activia is one example). This will lessen the chances of  stomach upset.     Recheck for any fever, confusion, nausea/vomiting or new concerns.

## 2024-02-23 LAB — BACTERIA UR CULT: ABNORMAL

## 2024-02-24 ENCOUNTER — TELEPHONE (OUTPATIENT)
Dept: URGENT CARE | Facility: CLINIC | Age: 79
End: 2024-02-24
Payer: MEDICARE

## 2024-03-27 PROBLEM — G40.909 EPILEPSY, UNSPECIFIED, NOT INTRACTABLE, WITHOUT STATUS EPILEPTICUS: Status: ACTIVE | Noted: 2024-03-27

## 2024-06-24 PROBLEM — I73.9 CLAUDICATION IN PERIPHERAL VASCULAR DISEASE: Status: ACTIVE | Noted: 2024-06-24

## 2024-07-18 ENCOUNTER — INITIAL CONSULT (OUTPATIENT)
Dept: VASCULAR SURGERY | Facility: CLINIC | Age: 79
End: 2024-07-18
Payer: MEDICARE

## 2024-07-18 VITALS
HEIGHT: 62 IN | HEART RATE: 79 BPM | SYSTOLIC BLOOD PRESSURE: 152 MMHG | DIASTOLIC BLOOD PRESSURE: 81 MMHG | BODY MASS INDEX: 27.97 KG/M2 | WEIGHT: 152 LBS | RESPIRATION RATE: 18 BRPM

## 2024-07-18 DIAGNOSIS — I73.9 PAD (PERIPHERAL ARTERY DISEASE): Primary | ICD-10-CM

## 2024-07-18 PROCEDURE — 99215 OFFICE O/P EST HI 40 MIN: CPT | Mod: S$GLB,,, | Performed by: STUDENT IN AN ORGANIZED HEALTH CARE EDUCATION/TRAINING PROGRAM

## 2024-07-18 PROCEDURE — 99999 PR PBB SHADOW E&M-EST. PATIENT-LVL IV: CPT | Mod: PBBFAC,,, | Performed by: STUDENT IN AN ORGANIZED HEALTH CARE EDUCATION/TRAINING PROGRAM

## 2024-07-18 NOTE — PROGRESS NOTES
[place] - Cardio Vascular  [facility]  History & Physical    SUBJECTIVE:     Chief Complaint:   Chief Complaint   Patient presents with    Peripheral Vascular Disease         History of Present Illness:  Samina Sprague is a 79 y.o. female presents with diabetes, HTN, HLD and peripheral arterial disease.  She presents as a referral from Dr. Tinoco after a left lower extremity angiogram.    She has been having significant leg cramping and weakness in both of her legs.  She feels that they have limited her activities of daily living.  These complaints has significantly limited her activity level in her family who accompanies her during the clinic visit today says she has been most of her time in her couch because of the pain.  She was never smoked cigarettes          Review of patient's allergies indicates:   Allergen Reactions    Crestor [rosuvastatin] Other (See Comments)     Muscle pains    Lisinopril Other (See Comments)     COUGH        Past Medical History:   Diagnosis Date    Abnormal mammogram     Claudication in peripheral vascular disease     Elevated alkaline phosphatase level     Essential hypertension     History of chicken pox     Hyperlipidemia     Hypothyroid     Osteopenia     Other and unspecified hyperlipidemia     Rheumatoid arthritis     Seizure disorder     Shingles     Sleep apnea     no CPAP    Type II or unspecified type diabetes mellitus without mention of complication, not stated as uncontrolled     UTI 10/2015     10/16/15 RXd Ceftin And Ordered A U/A And UCx    Vitamin D deficiency      Past Surgical History:   Procedure Laterality Date    ABLATION Left 06/16/2022    Procedure: Ablation;  Surgeon: Lucho James III, MD;  Location: [facility];  Service: Cardiology;  Laterality: Left;    AORTOGRAPHY WITH EXTREMITY RUNOFF  6/24/2024    Procedure: Abdominal w/Runoff;  Surgeon: Bernardo Tinoco MD;  Location: [facility];  Service: Cardiology;;    CATARACT EXTRACTION  1/2014,  2/2014    CHOLECYSTECTOMY      HYSTERECTOMY      INSERTION OF IMPLANTABLE LOOP RECORDER Left 07/26/2022    Procedure: Insertion, Implantable Loop Recorder;  Surgeon: Bernardo Tinoco MD;  Location: [facility];  Service: Cardiology;  Laterality: Left;    SQUAMOUS CELL CARCINOMA EXCISION  08/2023    nose    TONSILLECTOMY      tube in ear Left     Dr. Nicolas     WISDOM TOOTH EXTRACTION       Family History   Problem Relation Name Age of Onset    Heart disease Mother      Diabetes Mother      Early death Father          electrical accident    Diabetes Sister      Heart disease Brother      Diabetes Brother       Social History     Tobacco Use    Smoking status: Never     Passive exposure: Current    Smokeless tobacco: Never   Substance Use Topics    Alcohol use: No    Drug use: Never        Review of Systems:  Review of Systems   All other systems reviewed and are negative.      OBJECTIVE:     Vital Signs (Most Recent):  Pulse: 79 (07/18/24 1045)  Resp: 18 (07/18/24 1045)  BP: (!) 152/81 (07/18/24 1045)    Physical Exam:  Physical Exam   Constitutional: She is oriented to person, place, and time.  Non-toxic appearance. No distress.   Cardiovascular: Normal rate.   Nonpalpable pedal pulses Pulmonary:      Effort: Pulmonary effort is normal. No respiratory distress.      Breath sounds: No wheezing.     Abdominal: Soft. She exhibits no distension. There is no abdominal tenderness.   Musculoskeletal:      Right lower leg: No edema.      Left lower leg: No edema.   Neurological: She is alert and oriented to person, place, and time.   Skin: Capillary refill takes more than 3 seconds.   Psychiatric: Her behavior is normal. Mood normal.   Vitals reviewed.      Laboratory:  Lab Results   Component Value Date    WBC 9.13 06/24/2024    HGB 15.2 06/24/2024    HCT 46.7 06/24/2024     06/24/2024    CHOL 241 (H) 03/27/2024    TRIG 165 (H) 03/27/2024    HDL 70 03/27/2024    ALT 51 (H) 06/24/2024    AST 40 (H) 06/24/2024    NA  139 06/24/2024    K 4.3 06/24/2024     06/24/2024    CREATININE 0.62 06/24/2024    BUN 17 06/24/2024    CO2 26 06/24/2024    TSH 1.030 03/27/2024    HGBA1C 7.7 (H) 03/27/2024         Diagnostic Results:  COMPARISON:  None     FINDINGS:  Color flow duplex imaging of the arteries of the left lower extremity     Clinical history is no pulse.     The common femoral artery shows triphasic flow.  There is biphasic flow in the profundus femoris throughout the superficial femoral, popliteal, peroneal, anterior and posterior tibial artery.  A significant focal stenosis is not seen.     Impression:     Decreased velocities in decreased waveforms below the common femoral artery.  A focal stenosis is not demonstrated.      ASSESSMENT/PLAN:     Patient's presentation, history, and exam findings are most consistent with lifestyle limiting claudication of the bilateral lower extremity.  It is clear that their symptoms have limited their lifestyle in such a way that they have a hard time ambulating even short distances without severe pain. Performing basic activities of daily living have become challenging and threatens their ability to maintain independence.  I have reviewed all imaging findings including the arterial US and angiogram. The US and angiogram illustrates significant peripheral arterial disease with a proximal common iliac artery stenosis and a left superficial femoral artery total occlusion.  I have personally discussed these findings with the patient as well as the etiology of their pathology.      Physician supervised conservative management is not the best option for this patient because the severity of their lifestyle limiting symptoms is prohibitive to participation and success.  My recommendation is to perform an angiogram of the bilateral lower extremity with plans to intervene on the lesions found on imaging to optimizes perfusion to the extremity in order to relieve pain and reestablish some level of  functional independence.  I discuss these recommendations with the patient as well as the risk and benefits with the patient and all their questions were answered.     Her last serum creatinine was reviewed and was 0.62 and suitable for contrast administration    Plan:  Will plan for LLE angiogram 1st and then a right lower extremity angiogram at a later date        Dallas Atwood M.D.   [facility]         Cimzia Pregnancy And Lactation Text: This medication crosses the placenta but can be considered safe in certain situations. Cimzia may be excreted in breast milk.

## 2024-07-19 DIAGNOSIS — I73.9 PAD (PERIPHERAL ARTERY DISEASE): Primary | ICD-10-CM

## 2024-07-19 RX ORDER — LIDOCAINE HYDROCHLORIDE 10 MG/ML
1 INJECTION, SOLUTION EPIDURAL; INFILTRATION; INTRACAUDAL; PERINEURAL ONCE
OUTPATIENT
Start: 2024-07-19 | End: 2024-07-19

## 2024-07-30 ENCOUNTER — TELEPHONE (OUTPATIENT)
Dept: VASCULAR SURGERY | Facility: CLINIC | Age: 79
End: 2024-07-30
Payer: MEDICARE

## 2024-07-30 NOTE — TELEPHONE ENCOUNTER
Pt scheduled for procedure on other leg on 8/9 - she will follow up afterwards.     ----- Message from Scarlett Little sent at 7/30/2024  1:07 PM CDT -----  Type : Patient Call      Who Called :  Patient      Does the patient know what this is regarding?: Pt is requesting a call back ; pt was told to schedule a post op apt 2 weeks after her procedure with provider ; pt is wanting to know if provider would like to see her before her next procedure which is scheduled on 8/9/ ; please advise        Would the patient rather a call back or a response via [facility]? Call        Best Call Back Number: [phone]        Additional Information:

## 2024-08-05 ENCOUNTER — TELEPHONE (OUTPATIENT)
Dept: VASCULAR SURGERY | Facility: CLINIC | Age: 79
End: 2024-08-05
Payer: MEDICARE

## 2024-08-06 PROBLEM — G47.33 OSA (OBSTRUCTIVE SLEEP APNEA): Status: ACTIVE | Noted: 2024-08-06

## 2024-08-15 ENCOUNTER — TELEPHONE (OUTPATIENT)
Dept: VASCULAR SURGERY | Facility: CLINIC | Age: 79
End: 2024-08-15
Payer: MEDICARE

## 2024-08-15 NOTE — TELEPHONE ENCOUNTER
Disregard - Peer to Peer set up today at 4 - previously done.     ----- Message from Antwan Canales sent at 8/15/2024  9:50 AM CDT -----  Type:  Needs Medical Advice    Who Called: Laura hassan/ [employer]  Symptoms (please be specific):  How long has patient had these symptoms:    Pharmacy name and phone #:    Would the patient rather a call back or a response via [facility]? call back/  Best Call Back Number: [phone]  Additional Information: Case # [id number]  Set up peer to peer  Have one hour to rtn call  Please advise  Thanks

## 2024-08-20 ENCOUNTER — TELEPHONE (OUTPATIENT)
Dept: VASCULAR SURGERY | Facility: CLINIC | Age: 79
End: 2024-08-20
Payer: MEDICARE

## 2024-08-20 NOTE — TELEPHONE ENCOUNTER
Spoke w/ pt. Appt scheduled for 9/5. Pt verbalized understanding.       ----- Message from Julian Goodwin sent at 8/20/2024 10:05 AM CDT -----  Contact: self  Type:  Sooner Appointment Request    Caller is requesting a sooner appointment.  Caller declined first available appointment listed below.  Caller will not accept being placed on the waitlist and is requesting a message be sent to doctor.    Name of Caller:  PT  When is the first available appointment?  N/a  Symptoms: Needs a 2 week hosp f/u appt from 08/09  Would the patient rather a call back or a response via [facility]? call  Best Call Back Number:  [phone]   Additional Information:

## 2024-09-05 ENCOUNTER — OFFICE VISIT (OUTPATIENT)
Dept: VASCULAR SURGERY | Facility: CLINIC | Age: 79
End: 2024-09-05
Payer: MEDICARE

## 2024-09-05 VITALS
HEART RATE: 86 BPM | HEIGHT: 62 IN | SYSTOLIC BLOOD PRESSURE: 137 MMHG | BODY MASS INDEX: 27.7 KG/M2 | WEIGHT: 150.56 LBS | DIASTOLIC BLOOD PRESSURE: 77 MMHG

## 2024-09-05 DIAGNOSIS — I73.9 PAD (PERIPHERAL ARTERY DISEASE): Primary | ICD-10-CM

## 2024-09-05 PROCEDURE — 99999 PR PBB SHADOW E&M-EST. PATIENT-LVL III: CPT | Mod: PBBFAC,,, | Performed by: STUDENT IN AN ORGANIZED HEALTH CARE EDUCATION/TRAINING PROGRAM

## 2024-09-05 NOTE — PROGRESS NOTES
[place] - Cardio Vascular  [facility]  Follow-up Visit    Samina Sprague is a 79 y.o. female who is following up for bilateral lower extremity angiogram for bilateral lower extremity rest pain and chronic limb-threatening ischemia.    She was still has ongoing complaints of bilateral lower extremity pain.  She was seen an orthopedic surgeon who plans to do some joint injections as there was some concern that her arthritis has been be flaring up    Medications and Allergies:  Reviewed and updated today.    Vitals:  Vitals:    09/05/24 1122   BP: 137/77   Pulse: 86          Physical Exam  Mildly palpable distal pulses    Plan:    Continue Plavix and aspirin.    Follow up as needed      MD Rai  Vascular Surgery

## 2024-10-02 ENCOUNTER — PATIENT MESSAGE (OUTPATIENT)
Dept: PHARMACY | Facility: CLINIC | Age: 79
End: 2024-10-02
Payer: MEDICARE

## 2024-10-02 ENCOUNTER — TELEPHONE (OUTPATIENT)
Dept: PHARMACY | Facility: CLINIC | Age: 79
End: 2024-10-02
Payer: MEDICARE

## 2024-10-02 NOTE — TELEPHONE ENCOUNTER
Samina Sprague has been informed of the [employer] application process for [employer] and what's required to apply.  She will provide the following documents: Proof of household Income( such as social security statement, 1099 form, pension statement or 3 consecutive pay stubs, Copy of all Insurance cards( front and back), and Completed [facility] Authorization Forms        Follow-up will be made in 5 business days.     Meli Sevilla  Pharmacy Patient Assistance Team

## 2024-10-09 ENCOUNTER — PATIENT MESSAGE (OUTPATIENT)
Dept: PHARMACY | Facility: CLINIC | Age: 79
End: 2024-10-09
Payer: MEDICARE

## 2024-10-15 ENCOUNTER — PATIENT MESSAGE (OUTPATIENT)
Dept: PHARMACY | Facility: CLINIC | Age: 79
End: 2024-10-15
Payer: MEDICARE

## 2024-12-18 ENCOUNTER — OFFICE VISIT (OUTPATIENT)
Dept: VASCULAR SURGERY | Facility: CLINIC | Age: 79
End: 2024-12-18
Payer: MEDICARE

## 2024-12-18 VITALS
DIASTOLIC BLOOD PRESSURE: 71 MMHG | HEIGHT: 62 IN | HEART RATE: 85 BPM | SYSTOLIC BLOOD PRESSURE: 131 MMHG | WEIGHT: 149.69 LBS | BODY MASS INDEX: 27.55 KG/M2

## 2024-12-18 DIAGNOSIS — I73.9 PAD (PERIPHERAL ARTERY DISEASE): Primary | ICD-10-CM

## 2024-12-18 PROCEDURE — 99214 OFFICE O/P EST MOD 30 MIN: CPT | Mod: S$GLB,,, | Performed by: STUDENT IN AN ORGANIZED HEALTH CARE EDUCATION/TRAINING PROGRAM

## 2024-12-18 PROCEDURE — 1126F AMNT PAIN NOTED NONE PRSNT: CPT | Mod: CPTII,S$GLB,, | Performed by: STUDENT IN AN ORGANIZED HEALTH CARE EDUCATION/TRAINING PROGRAM

## 2024-12-18 PROCEDURE — 1101F PT FALLS ASSESS-DOCD LE1/YR: CPT | Mod: CPTII,S$GLB,, | Performed by: STUDENT IN AN ORGANIZED HEALTH CARE EDUCATION/TRAINING PROGRAM

## 2024-12-18 PROCEDURE — 3078F DIAST BP <80 MM HG: CPT | Mod: CPTII,S$GLB,, | Performed by: STUDENT IN AN ORGANIZED HEALTH CARE EDUCATION/TRAINING PROGRAM

## 2024-12-18 PROCEDURE — 3288F FALL RISK ASSESSMENT DOCD: CPT | Mod: CPTII,S$GLB,, | Performed by: STUDENT IN AN ORGANIZED HEALTH CARE EDUCATION/TRAINING PROGRAM

## 2024-12-18 PROCEDURE — 99999 PR PBB SHADOW E&M-EST. PATIENT-LVL III: CPT | Mod: PBBFAC,,, | Performed by: STUDENT IN AN ORGANIZED HEALTH CARE EDUCATION/TRAINING PROGRAM

## 2024-12-18 PROCEDURE — 3075F SYST BP GE 130 - 139MM HG: CPT | Mod: CPTII,S$GLB,, | Performed by: STUDENT IN AN ORGANIZED HEALTH CARE EDUCATION/TRAINING PROGRAM

## 2024-12-18 RX ORDER — CLOPIDOGREL BISULFATE 75 MG/1
75 TABLET ORAL DAILY
Qty: 30 TABLET | Refills: 11 | Status: SHIPPED | OUTPATIENT
Start: 2024-12-18 | End: 2025-12-18

## 2024-12-18 NOTE — PROGRESS NOTES
[place] - Cardio Vascular  [facility]  History & Physical    PATIENT NAME: Samina Sprague  MRN: [id number]  TODAY'S DATE: 12/30/2024    SUBJECTIVE:     Chief Complaint:   Chief Complaint   Patient presents with    Follow-up     US         History of Present Illness:    Samina Sprague is a 79 y.o. female who is following up for bilateral lower extremity angiogram for bilateral lower extremity rest pain and chronic limb-threatening ischemia.     She was still has ongoing complaints of bilateral lower extremity pain.  She was seen an orthopedic surgeon who plans to do some joint injections as there was some concern that her arthritis has been be flaring up.    Interval history 12/18/2024:  She reports that she is having intermittent pain in his left lower extremity with ambulation.  It seems that her pain is mostly in her left knee and calf.  She is able to walk at least 1 block.  The pain resolves with rest.      Review of patient's allergies indicates:   Allergen Reactions    Crestor [rosuvastatin] Other (See Comments)     Muscle pains    Lisinopril Other (See Comments)     COUGH        Past Medical History:   Diagnosis Date    Abnormal mammogram     Cancer     skin cancer to nose    Claudication in peripheral vascular disease     Elevated alkaline phosphatase level     Essential hypertension     History of chicken pox     Hyperlipidemia     Hypothyroid     Osteopenia     Other and unspecified hyperlipidemia     Rheumatoid arthritis     Seizure disorder     Shingles     Sleep apnea     no CPAP    Type II or unspecified type diabetes mellitus without mention of complication, not stated as uncontrolled     UTI 10/2015     10/16/15 RXd Ceftin And Ordered A U/A And UCx    Vitamin D deficiency      Past Surgical History:   Procedure Laterality Date    ABLATION Left 06/16/2022    Procedure: Ablation;  Surgeon: Lucho James III, MD;  Location: [facility];  Service: Cardiology;  Laterality: Left;     ANGIOGRAPHY OF LOWER EXTREMITY  07/29/2024    Procedure: Lt. Leg angiogram;  Surgeon: Dallas Atwood MD;  Location: [facility];  Service: Vascular;;    ANGIOGRAPHY OF LOWER EXTREMITY  08/09/2024    Procedure: Rt. Leg angiogram;  Surgeon: Dallas Atwood MD;  Location: [facility];  Service: Vascular;;    ANGIOPLASTY, PERIPHERAL BLOOD VESSEL  08/09/2024    Procedure: PTA / Atherectomy Rt. SFA;  Surgeon: Dallas Atwood MD;  Location: [facility];  Service: Vascular;;    AORTOGRAPHY WITH EXTREMITY RUNOFF  06/24/2024    Procedure: Abdominal w/Runoff;  Surgeon: Bernardo Tinoco MD;  Location: [facility];  Service: Cardiology;;    CATARACT EXTRACTION  1/2014, 2/2014    CHOLECYSTECTOMY      HYSTERECTOMY      INSERTION OF IMPLANTABLE LOOP RECORDER Left 07/26/2022    Procedure: Insertion, Implantable Loop Recorder;  Surgeon: Bernardo Tinoco MD;  Location: [facility];  Service: Cardiology;  Laterality: Left;    INTRAVASCULAR ULTRASOUND, NON-CORONARY  07/29/2024    Procedure: IVUS Lt. SFA;  Surgeon: Dallas Atwood MD;  Location: [facility];  Service: Vascular;;    PTA, ANTERIOR TIBIAL  07/29/2024    Procedure: PTA Anterior Tibial;  Surgeon: Dallas Atwood MD;  Location: [facility];  Service: Vascular;;    PTA, ARTERY, FEMOROPOPLITEAL, WITH ATHERECTOMY  07/29/2024    Procedure: PTA / Atherectomy / Stent Lt. SFA;  Surgeon: Dallas Atwood MD;  Location: [facility];  Service: Vascular;;    SQUAMOUS CELL CARCINOMA EXCISION  08/2023    nose    THROMBECTOMY  07/29/2024    Procedure: Thrombectomy Lt. Anterior Tibial;  Surgeon: Dallas Atwood MD;  Location: [facility];  Service: Vascular;;    TONSILLECTOMY      tube in ear Left     Dr. Maria Isabel AYALA TOOTH EXTRACTION       Family History   Problem Relation Name Age of Onset    Heart disease Mother Eloice     Diabetes Mother Eloice     Early death Mother Eloice     Early death Father Abdirashid Praful         electrical accident    Diabetes Sister      Heart disease  Brother Gil     Diabetes Brother Gil     Early death Brother Gil      Social History     Tobacco Use    Smoking status: Never     Passive exposure: Current    Smokeless tobacco: Never   Substance Use Topics    Alcohol use: No    Drug use: Never        Review of Systems:  ROS    OBJECTIVE:     Vital Signs (Most Recent):  Pulse: 85 (12/18/24 0904)  BP: 131/71 (12/18/24 0904)      Physical Exam:  Physical Exam  Constitutional: Awake and oriented to person, place, and time. Appears well-developed and well-nourished. In no apparent distress.  HEENT: Normocephalic. Pupils normal and conjunctivae normal. Mucous membranes normal, no cyanosis or icterus, trachea central, no pallor or icterus is noted.  Neck: Normal range of motion. Neck supple. No JVD present. No bruit present.   Cardiovascular: Normal rate, regular rhythm and normal heart sounds. S1, S2. Exam reveals no gallop, clicks, or friction rub. No murmur noted.  Pulmonary/Chest: Effort normal and breath sounds clear to auscultation. No respiratory distress. No wheezes, rales, or coughing present.   Abdominal: Soft. Bowel sounds are normal. There is no tenderness. No rebound tenderness or guarding present. No abdomen pulsations, bruits, or masses noted.   Urinary: No perez catheter present  Skin: Skin is warm and dry.  Extremities: No cyanosis, erythema, clubbing or edema noted at this time. No calf tenderness bilaterally.   Peripheral vascular system:  Difficult to palpate pulses on the left pedal      Laboratory:  Lab Results   Component Value Date    WBC 7.64 11/12/2024    HGB 15.2 11/12/2024    HCT 47.1 11/12/2024     11/12/2024    CHOL 241 (H) 03/27/2024    TRIG 165 (H) 03/27/2024    HDL 70 03/27/2024    ALT 26 11/12/2024    AST 30 11/12/2024     11/12/2024    K 4.0 11/12/2024     11/12/2024    CREATININE 0.69 11/12/2024    BUN 17 11/12/2024    CO2 26 11/12/2024    TSH 1.030 03/27/2024    HGBA1C 8.2 (H) 09/30/2024         Diagnostic  Results:  CV Ultrasound doppler arterial leg left    The left lower extremity has monophasic flow throughout with a 50-60%   left common femoral artery stenosis.    There is a patent stent in the left distal SFA    No results found for this or any previous visit (from the past 2160 hours).       ASSESSMENT/PLAN:     79-year-old female who presents for follow up for peripheral arterial disease.  She was status post bilateral lower extremity angiograms with the interventions    I did review her arterial ultrasound which illustrates 50-60% stenosis of the left common femoral artery    Discussed repeat angiography vs walking program. She would like to working on doing the walking program. Will plan for follow-up in 2mths to re-evaluate progression    She was taking plavix but was told to take just 2.5mg xarelto BID but then had bruising so started takign xarelto 2.5mg Qday.     Will plan to restart plavix and cont xarelto 2.5mg daily    F/u in 2mths    Dallas Atwood M.D.   [facility]   Date of Service: 12/30/2024

## 2025-02-20 ENCOUNTER — OFFICE VISIT (OUTPATIENT)
Dept: VASCULAR SURGERY | Facility: CLINIC | Age: 80
End: 2025-02-20
Payer: MEDICARE

## 2025-02-20 VITALS
HEART RATE: 84 BPM | OXYGEN SATURATION: 97 % | SYSTOLIC BLOOD PRESSURE: 136 MMHG | BODY MASS INDEX: 26.86 KG/M2 | HEIGHT: 62 IN | DIASTOLIC BLOOD PRESSURE: 81 MMHG | WEIGHT: 145.94 LBS

## 2025-02-20 DIAGNOSIS — I73.9 PAD (PERIPHERAL ARTERY DISEASE): Primary | ICD-10-CM

## 2025-02-20 RX ORDER — LIDOCAINE HYDROCHLORIDE 10 MG/ML
1 INJECTION, SOLUTION EPIDURAL; INFILTRATION; INTRACAUDAL; PERINEURAL ONCE
OUTPATIENT
Start: 2025-02-20 | End: 2025-02-20

## 2025-02-20 NOTE — PROGRESS NOTES
[place] - Cardio Vascular  [facility]  History & Physical    PATIENT NAME: Samina Sprague  MRN: [id number]  TODAY'S DATE: 02/20/2025    SUBJECTIVE:     Chief Complaint:   Chief Complaint   Patient presents with    Follow-up         History of Present Illness:  Samina Sprague is a 79 y.o. female who is following up for bilateral lower extremity angiogram for bilateral lower extremity rest pain and chronic limb-threatening ischemia.     She was still has ongoing complaints of bilateral lower extremity pain.  She was seen an orthopedic surgeon who plans to do some joint injections as there was some concern that her arthritis has been be flaring up.     Interval history 12/18/2024:  She reports that she is having intermittent pain in his left lower extremity with ambulation.  It seems that her pain is mostly in her left knee and calf.  She is able to walk at least 1 block.  The pain resolves with rest.    Interval history 02/20/2025:  She presents for follow up.  She continues to have pain in the left lower extremity.  The pain is progressive and does limit her ability to ambulate.  At times the pain is present at rest.  She has attempted conservative management with a walking program but is unable to walk very far due to the pain.      Review of patient's allergies indicates:   Allergen Reactions    Crestor [rosuvastatin] Other (See Comments)     Muscle pains    Lisinopril Other (See Comments)     COUGH        Past Medical History:   Diagnosis Date    Abnormal mammogram     Cancer     skin cancer to nose    Claudication in peripheral vascular disease     Elevated alkaline phosphatase level     Essential hypertension     History of chicken pox     Hyperlipidemia     Hypothyroid     Osteopenia     Other and unspecified hyperlipidemia     Rheumatoid arthritis     Seizure disorder     Shingles     Sleep apnea     no CPAP    Type II or unspecified type diabetes mellitus without mention of complication,  not stated as uncontrolled     UTI 10/2015     10/16/15 RXd Ceftin And Ordered A U/A And UCx    Vitamin D deficiency      Past Surgical History:   Procedure Laterality Date    ABLATION Left 06/16/2022    Procedure: Ablation;  Surgeon: Lucho James III, MD;  Location: [facility];  Service: Cardiology;  Laterality: Left;    ANGIOGRAPHY OF LOWER EXTREMITY  07/29/2024    Procedure: Lt. Leg angiogram;  Surgeon: Dallas Atwood MD;  Location: [facility];  Service: Vascular;;    ANGIOGRAPHY OF LOWER EXTREMITY  08/09/2024    Procedure: Rt. Leg angiogram;  Surgeon: Dallas Atwood MD;  Location: [facility];  Service: Vascular;;    ANGIOPLASTY, PERIPHERAL BLOOD VESSEL  08/09/2024    Procedure: PTA / Atherectomy Rt. SFA;  Surgeon: Dallas Atwood MD;  Location: [facility];  Service: Vascular;;    AORTOGRAPHY WITH EXTREMITY RUNOFF  06/24/2024    Procedure: Abdominal w/Runoff;  Surgeon: Bernardo Tinoco MD;  Location: [facility];  Service: Cardiology;;    CATARACT EXTRACTION  1/2014, 2/2014    CHOLECYSTECTOMY      HYSTERECTOMY      INSERTION OF IMPLANTABLE LOOP RECORDER Left 07/26/2022    Procedure: Insertion, Implantable Loop Recorder;  Surgeon: Bernardo Tinoco MD;  Location: [facility];  Service: Cardiology;  Laterality: Left;    INTRAVASCULAR ULTRASOUND, NON-CORONARY  07/29/2024    Procedure: IVUS Lt. SFA;  Surgeon: Dallas Atwood MD;  Location: [facility];  Service: Vascular;;    PTA, ANTERIOR TIBIAL  07/29/2024    Procedure: PTA Anterior Tibial;  Surgeon: Dallas Atwood MD;  Location: [facility];  Service: Vascular;;    PTA, ARTERY, FEMOROPOPLITEAL, WITH ATHERECTOMY  07/29/2024    Procedure: PTA / Atherectomy / Stent Lt. SFA;  Surgeon: Dallas Atwood MD;  Location: [facility];  Service: Vascular;;    SQUAMOUS CELL CARCINOMA EXCISION  08/2023    nose    THROMBECTOMY  07/29/2024    Procedure: Thrombectomy Lt. Anterior Tibial;  Surgeon: Dallas Atwood MD;  Location: [facility];  Service: Vascular;;     TONSILLECTOMY      tube in ear Left     Dr. Nicolas     WISDOM TOOTH EXTRACTION       Family History   Problem Relation Name Age of Onset    Heart disease Mother Eloice     Diabetes Mother Eloice     Early death Mother Eloice     Early death Father Abdirashid Basilio         electrical accident    Diabetes Sister      Heart disease Brother Gil     Diabetes Brother Gil     Early death Brother Gil      Social History[1]     Review of Systems:  ROS    OBJECTIVE:     Vital Signs (Most Recent):  Pulse: 84 (02/20/25 0941)  BP: 136/81 (02/20/25 0941)  SpO2: 97 % (02/20/25 0941)      Physical Exam:  Physical Exam  Constitutional: Awake and oriented to person, place, and time. Appears well-developed and well-nourished. In no apparent distress.  HEENT: Normocephalic. Pupils normal and conjunctivae normal. Mucous membranes normal, no cyanosis or icterus, trachea central, no pallor or icterus is noted.  Neck: Normal range of motion. Neck supple. No JVD present. No bruit present.   Cardiovascular: Normal rate, regular rhythm and normal heart sounds. S1, S2. Exam reveals no gallop, clicks, or friction rub. No murmur noted.  Pulmonary/Chest: Effort normal and breath sounds clear to auscultation. No respiratory distress. No wheezes, rales, or coughing present.   Abdominal: Soft. Bowel sounds are normal. There is no tenderness. No rebound tenderness or guarding present. No abdomen pulsations, bruits, or masses noted.   Urinary: No perez catheter present  Skin: Skin is warm and dry.  Extremities: No cyanosis, erythema, clubbing or edema noted at this time. No calf tenderness bilaterally.   Peripheral vascular system:  Nonpalpable left pedal pulses    Laboratory:  Lab Results   Component Value Date    WBC 7.64 11/12/2024    HGB 15.2 11/12/2024    HCT 47.1 11/12/2024     11/12/2024    CHOL 241 (H) 03/27/2024    TRIG 165 (H) 03/27/2024    HDL 70 03/27/2024    ALT 26 11/12/2024    AST 30 11/12/2024     11/12/2024    K 4.0  11/12/2024     11/12/2024    CREATININE 0.69 11/12/2024    BUN 17 11/12/2024    CO2 26 11/12/2024    TSH 1.030 03/27/2024    HGBA1C 7.3 (H) 12/30/2024         Diagnostic Results:  CV Ultrasound doppler arterial leg left    The left lower extremity has monophasic flow throughout with a 50-60%   left common femoral artery stenosis.    There is a patent stent in the left distal SFA    No results found for this or any previous visit (from the past 2160 hours).       ASSESSMENT/PLAN:       Patient's presentation, history, and exam findings are most consistent with chronic limb threatening ischemia with progressive disabling claudication with now impending rest pain of the left lower extremity.  It is clear that their symptoms have disabled their lifestyle, ability to ambulate, ability to sleep, and independence with minimal relief even with pain medications.     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  I do have concerns that she has developed an occlusion of the stent or end stent stenosis.  I have personally discussed these findings with the patient as well as the etiology of their pathology.      I am concerned that they are at risk for limb loss if intervention is significantly delayed, leaving them with no option for a trial of conservative management at this time.  My recommendation is to perform an angiogram of the left lower extremity with plans to intervene on the lesions found on imaging to optimizes perfusion to the extremity in order to relieve pain and reestablish some level of functional independence.  I discuss these recommendations with the patient as well as the risk and benefits with the patient and all their questions were answered.         Plan:  Okay to stop Xarelto  Continue aspirin and Plavix  We will plan for left lower extremity angiogram          Dallas Atwood M.D.   [facility]    Date of Service: 02/20/2025             [1]   Social History  Tobacco Use    Smoking status: Never     Passive exposure: Current    Smokeless tobacco: Never   Substance Use Topics    Alcohol use: No    Drug use: Never

## 2025-02-21 ENCOUNTER — TELEPHONE (OUTPATIENT)
Dept: VASCULAR SURGERY | Facility: CLINIC | Age: 80
End: 2025-02-21
Payer: MEDICARE

## 2025-02-21 NOTE — TELEPHONE ENCOUNTER
Spoke w/ pt. Angio scheduled for 3/7. Informed pt to continue taking blood thinners. Advised pt to expect a call 1-2 days prior to procedure. Pt verbalized understanding.

## 2025-03-07 DIAGNOSIS — I70.219 ATHEROSCLEROSIS OF ARTERY OF EXTREMITY WITH INTERMITTENT CLAUDICATION: ICD-10-CM

## 2025-03-07 DIAGNOSIS — I70.212 ATHEROSCLEROSIS OF NATIVE ARTERIES OF EXTREMITIES WITH INTERMITTENT CLAUDICATION, LEFT LEG: ICD-10-CM

## 2025-03-07 DIAGNOSIS — I73.9 PAD (PERIPHERAL ARTERY DISEASE): Primary | ICD-10-CM

## 2025-03-07 RX ORDER — CEFAZOLIN SODIUM 2 G/50ML
2 SOLUTION INTRAVENOUS
OUTPATIENT
Start: 2025-03-07

## 2025-03-07 RX ORDER — LIDOCAINE HYDROCHLORIDE 10 MG/ML
1 INJECTION, SOLUTION EPIDURAL; INFILTRATION; INTRACAUDAL; PERINEURAL ONCE
OUTPATIENT
Start: 2025-03-07 | End: 2025-03-07

## 2025-03-27 ENCOUNTER — OFFICE VISIT (OUTPATIENT)
Dept: VASCULAR SURGERY | Facility: CLINIC | Age: 80
End: 2025-03-27
Payer: MEDICARE

## 2025-03-27 ENCOUNTER — TELEPHONE (OUTPATIENT)
Dept: VASCULAR SURGERY | Facility: CLINIC | Age: 80
End: 2025-03-27
Payer: MEDICARE

## 2025-03-27 VITALS
HEIGHT: 62 IN | BODY MASS INDEX: 26.57 KG/M2 | HEART RATE: 86 BPM | SYSTOLIC BLOOD PRESSURE: 139 MMHG | WEIGHT: 144.38 LBS | DIASTOLIC BLOOD PRESSURE: 90 MMHG

## 2025-03-27 DIAGNOSIS — I73.9 PAD (PERIPHERAL ARTERY DISEASE): Primary | ICD-10-CM

## 2025-03-27 PROCEDURE — 1111F DSCHRG MED/CURRENT MED MERGE: CPT | Mod: CPTII,S$GLB,, | Performed by: STUDENT IN AN ORGANIZED HEALTH CARE EDUCATION/TRAINING PROGRAM

## 2025-03-27 PROCEDURE — 1159F MED LIST DOCD IN RCRD: CPT | Mod: CPTII,S$GLB,, | Performed by: STUDENT IN AN ORGANIZED HEALTH CARE EDUCATION/TRAINING PROGRAM

## 2025-03-27 PROCEDURE — 3075F SYST BP GE 130 - 139MM HG: CPT | Mod: CPTII,S$GLB,, | Performed by: STUDENT IN AN ORGANIZED HEALTH CARE EDUCATION/TRAINING PROGRAM

## 2025-03-27 PROCEDURE — 1126F AMNT PAIN NOTED NONE PRSNT: CPT | Mod: CPTII,S$GLB,, | Performed by: STUDENT IN AN ORGANIZED HEALTH CARE EDUCATION/TRAINING PROGRAM

## 2025-03-27 PROCEDURE — 99999 PR PBB SHADOW E&M-EST. PATIENT-LVL III: CPT | Mod: PBBFAC,,, | Performed by: STUDENT IN AN ORGANIZED HEALTH CARE EDUCATION/TRAINING PROGRAM

## 2025-03-27 PROCEDURE — 1101F PT FALLS ASSESS-DOCD LE1/YR: CPT | Mod: CPTII,S$GLB,, | Performed by: STUDENT IN AN ORGANIZED HEALTH CARE EDUCATION/TRAINING PROGRAM

## 2025-03-27 PROCEDURE — 3288F FALL RISK ASSESSMENT DOCD: CPT | Mod: CPTII,S$GLB,, | Performed by: STUDENT IN AN ORGANIZED HEALTH CARE EDUCATION/TRAINING PROGRAM

## 2025-03-27 PROCEDURE — 99024 POSTOP FOLLOW-UP VISIT: CPT | Mod: S$GLB,,, | Performed by: STUDENT IN AN ORGANIZED HEALTH CARE EDUCATION/TRAINING PROGRAM

## 2025-03-27 PROCEDURE — 3080F DIAST BP >= 90 MM HG: CPT | Mod: CPTII,S$GLB,, | Performed by: STUDENT IN AN ORGANIZED HEALTH CARE EDUCATION/TRAINING PROGRAM

## 2025-03-27 NOTE — PROGRESS NOTES
[place] - Cardio Vascular  [facility]  Follow-up Visit    Samina Sprague is a 79 y.o. female who is following up for  left iliofemoral endarterectomy with a bovine patch angioplasty on 03/12/2025    Her pain in his left lower extremity is improved substantially.  She is able to walk with a increased walking distance with minimal pain.    Medications and Allergies:  Reviewed and updated today.    Vitals:  Vitals:    03/27/25 1133   BP: (!) 139/90   Pulse: 86          Physical Exam  Nonpalpable left pedal pulse  Left groin incision clean dry and intact    Plan:  We will plan for follow up in 1-2 months this is how she is improving in regards to her walking distance      MD Rai  Vascular Surgery

## 2025-03-27 NOTE — TELEPHONE ENCOUNTER
Spoke w/ pt. Appt scheduled for 5/1. Pt verbalized understanding.      ----- Message from [place] sent at 3/27/2025 11:45 AM CDT -----  Regarding: Schedule 1 month f/u  Pt would like to book a follow up in 1 month in April, please contact her to set this up.

## 2025-05-01 ENCOUNTER — OFFICE VISIT (OUTPATIENT)
Facility: CLINIC | Age: 80
End: 2025-05-01
Payer: MEDICARE

## 2025-05-01 VITALS
DIASTOLIC BLOOD PRESSURE: 80 MMHG | WEIGHT: 143.5 LBS | SYSTOLIC BLOOD PRESSURE: 126 MMHG | HEART RATE: 103 BPM | HEIGHT: 62 IN | BODY MASS INDEX: 26.41 KG/M2

## 2025-05-01 DIAGNOSIS — I73.9 PAD (PERIPHERAL ARTERY DISEASE): Primary | ICD-10-CM

## 2025-05-01 PROCEDURE — 99999 PR PBB SHADOW E&M-EST. PATIENT-LVL III: CPT | Mod: PBBFAC,,, | Performed by: STUDENT IN AN ORGANIZED HEALTH CARE EDUCATION/TRAINING PROGRAM

## 2025-05-01 NOTE — PROGRESS NOTES
[place] - Vascular Surgery  [facility]  Follow-up Visit    Samina Sprague is a 79 y.o. female who is following up for  left iliofemoral endarterectomy with a bovine patch angioplasty on 03/12/2025     She does feel that her pain in his left leg that has slowly gotten better with the increased walking distance but she is still does continue to have pain in his left calf.  She is able to do activities of daily living but her pain that has limit her overall activity level at times.    Medications and Allergies:  Reviewed and updated today.    Vitals:  Vitals:    05/01/25 0841   BP: 126/80   Pulse: 103          Physical Exam  Left groin clean dry and intact and well healed.  Nonpalpable pulse in the    Plan:  I did have an extensive discussion with the patient about her claudication. although it is improved after endarterectomy of the left common femoral she still does have pain in his left leg.  She does have a known left superficial artery stent occlusion.    I do think this is something that could be revascularized.  I would like for her to continue a more aggressive walking regimen prior to committing to endovascular revascularization of her left SFA stent.  Her superficial femoral artery is quite small and I do have some concern if we are able to revascularize her stent that she would be at risk for stent reocclusion    We will plan for ongoing walking regimen.  Left lower extremity arterial ultrasound in 1-2 weeks with a JUAN PABLO and then we will repeat in three-month with follow up.  Okay to stop aspirin.  Continue Plavix    MD Rai

## 2025-05-02 DIAGNOSIS — I73.9 PAD (PERIPHERAL ARTERY DISEASE): Primary | ICD-10-CM

## 2025-05-07 ENCOUNTER — HOSPITAL ENCOUNTER (OUTPATIENT)
Dept: RADIOLOGY | Facility: HOSPITAL | Age: 80
Discharge: HOME | End: 2025-05-07
Attending: STUDENT IN AN ORGANIZED HEALTH CARE EDUCATION/TRAINING PROGRAM
Payer: MEDICARE

## 2025-05-07 DIAGNOSIS — I73.9 PAD (PERIPHERAL ARTERY DISEASE): ICD-10-CM

## 2025-05-07 PROCEDURE — 93925 LOWER EXTREMITY STUDY: CPT | Mod: 26,,, | Performed by: RADIOLOGY

## 2025-05-07 PROCEDURE — 93922 UPR/L XTREMITY ART 2 LEVELS: CPT | Mod: 26,,, | Performed by: RADIOLOGY

## 2025-05-07 PROCEDURE — 93925 LOWER EXTREMITY STUDY: CPT | Mod: TC,PO

## 2025-05-08 ENCOUNTER — RESULTS FOLLOW-UP (OUTPATIENT)
Facility: CLINIC | Age: 80
End: 2025-05-08

## 2025-06-02 DIAGNOSIS — I73.9 PAD (PERIPHERAL ARTERY DISEASE): Primary | ICD-10-CM

## 2025-07-28 PROBLEM — M25.562 ARTHRALGIA OF BOTH KNEES: Status: ACTIVE | Noted: 2024-08-28

## 2025-07-28 PROBLEM — M79.641 PAIN IN BOTH HANDS: Status: ACTIVE | Noted: 2024-08-28

## 2025-07-28 PROBLEM — M25.561 ARTHRALGIA OF BOTH KNEES: Status: ACTIVE | Noted: 2024-08-28

## 2025-07-28 PROBLEM — M25.532 BILATERAL WRIST PAIN: Status: ACTIVE | Noted: 2024-08-28

## 2025-07-28 PROBLEM — M79.642 PAIN IN BOTH HANDS: Status: ACTIVE | Noted: 2024-08-28

## 2025-07-28 PROBLEM — M54.2 NECK PAIN: Status: ACTIVE | Noted: 2024-08-28

## 2025-07-28 PROBLEM — K21.9 GASTROESOPHAGEAL REFLUX DISEASE: Status: ACTIVE | Noted: 2025-07-28

## 2025-07-28 PROBLEM — M25.531 BILATERAL WRIST PAIN: Status: ACTIVE | Noted: 2024-08-28

## 2025-07-28 PROBLEM — E03.9 HYPOTHYROIDISM: Status: ACTIVE | Noted: 2025-07-28

## 2025-07-28 PROBLEM — M06.9 RHEUMATOID ARTHRITIS: Status: ACTIVE | Noted: 2024-08-28

## 2025-08-07 ENCOUNTER — HOSPITAL ENCOUNTER (OUTPATIENT)
Dept: RADIOLOGY | Facility: HOSPITAL | Age: 80
Discharge: HOME | End: 2025-08-07
Attending: STUDENT IN AN ORGANIZED HEALTH CARE EDUCATION/TRAINING PROGRAM
Payer: MEDICARE

## 2025-08-07 DIAGNOSIS — I73.9 PAD (PERIPHERAL ARTERY DISEASE): ICD-10-CM

## 2025-08-07 PROCEDURE — 93922 UPR/L XTREMITY ART 2 LEVELS: CPT | Mod: TC,PO

## 2025-08-07 PROCEDURE — 93922 UPR/L XTREMITY ART 2 LEVELS: CPT | Mod: 26,,, | Performed by: RADIOLOGY

## 2025-08-07 PROCEDURE — 93925 LOWER EXTREMITY STUDY: CPT | Mod: 26,,, | Performed by: RADIOLOGY

## 2025-08-11 ENCOUNTER — RESULTS FOLLOW-UP (OUTPATIENT)
Facility: CLINIC | Age: 80
End: 2025-08-11
Payer: MEDICARE

## 2025-08-12 DIAGNOSIS — I73.9 PERIPHERAL VASCULAR DISEASE, UNSPECIFIED: Primary | ICD-10-CM

## 2025-08-22 DIAGNOSIS — I73.9 PERIPHERAL VASCULAR DISEASE, UNSPECIFIED: Primary | ICD-10-CM

## 2025-08-25 ENCOUNTER — TELEPHONE (OUTPATIENT)
Dept: PAIN MEDICINE | Facility: CLINIC | Age: 80
End: 2025-08-25
Payer: MEDICARE

## 2025-08-26 ENCOUNTER — OFFICE VISIT (OUTPATIENT)
Dept: PAIN MEDICINE | Facility: CLINIC | Age: 80
End: 2025-08-26
Payer: MEDICARE

## 2025-08-26 VITALS — WEIGHT: 148 LBS | BODY MASS INDEX: 27.23 KG/M2 | HEIGHT: 62 IN

## 2025-08-26 DIAGNOSIS — M54.2 NECK PAIN: Primary | ICD-10-CM

## 2025-08-26 PROCEDURE — 1125F AMNT PAIN NOTED PAIN PRSNT: CPT | Mod: CPTII,S$GLB,, | Performed by: STUDENT IN AN ORGANIZED HEALTH CARE EDUCATION/TRAINING PROGRAM

## 2025-08-26 PROCEDURE — 99204 OFFICE O/P NEW MOD 45 MIN: CPT | Mod: S$GLB,,, | Performed by: STUDENT IN AN ORGANIZED HEALTH CARE EDUCATION/TRAINING PROGRAM

## 2025-08-26 PROCEDURE — 99999 PR PBB SHADOW E&M-EST. PATIENT-LVL V: CPT | Mod: PBBFAC,,, | Performed by: STUDENT IN AN ORGANIZED HEALTH CARE EDUCATION/TRAINING PROGRAM

## 2025-08-26 PROCEDURE — 3288F FALL RISK ASSESSMENT DOCD: CPT | Mod: CPTII,S$GLB,, | Performed by: STUDENT IN AN ORGANIZED HEALTH CARE EDUCATION/TRAINING PROGRAM

## 2025-08-26 PROCEDURE — 1159F MED LIST DOCD IN RCRD: CPT | Mod: CPTII,S$GLB,, | Performed by: STUDENT IN AN ORGANIZED HEALTH CARE EDUCATION/TRAINING PROGRAM

## 2025-08-26 PROCEDURE — 1160F RVW MEDS BY RX/DR IN RCRD: CPT | Mod: CPTII,S$GLB,, | Performed by: STUDENT IN AN ORGANIZED HEALTH CARE EDUCATION/TRAINING PROGRAM

## 2025-08-26 PROCEDURE — 1101F PT FALLS ASSESS-DOCD LE1/YR: CPT | Mod: CPTII,S$GLB,, | Performed by: STUDENT IN AN ORGANIZED HEALTH CARE EDUCATION/TRAINING PROGRAM

## 2025-08-26 RX ORDER — TIZANIDINE 4 MG/1
4 TABLET ORAL EVERY 8 HOURS
Qty: 90 TABLET | Refills: 2 | Status: SHIPPED | OUTPATIENT
Start: 2025-08-26 | End: 2025-09-25

## 2025-08-28 ENCOUNTER — HOSPITAL ENCOUNTER (OUTPATIENT)
Dept: RADIOLOGY | Facility: HOSPITAL | Age: 80
Discharge: HOME | End: 2025-08-28
Attending: STUDENT IN AN ORGANIZED HEALTH CARE EDUCATION/TRAINING PROGRAM
Payer: MEDICARE

## 2025-08-28 DIAGNOSIS — I73.9 PERIPHERAL VASCULAR DISEASE, UNSPECIFIED: ICD-10-CM

## 2025-08-28 DIAGNOSIS — I73.9 PAD (PERIPHERAL ARTERY DISEASE): Primary | ICD-10-CM

## 2025-08-28 PROCEDURE — 25500020 PHARM REV CODE 255: Mod: PO | Performed by: STUDENT IN AN ORGANIZED HEALTH CARE EDUCATION/TRAINING PROGRAM

## 2025-08-28 PROCEDURE — 75635 CT ANGIO ABDOMINAL ARTERIES: CPT | Mod: TC,PO

## 2025-08-28 PROCEDURE — 75635 CT ANGIO ABDOMINAL ARTERIES: CPT | Mod: 26,,, | Performed by: RADIOLOGY

## 2025-08-28 RX ORDER — LIDOCAINE HYDROCHLORIDE 10 MG/ML
1 INJECTION, SOLUTION EPIDURAL; INFILTRATION; INTRACAUDAL; PERINEURAL ONCE
OUTPATIENT
Start: 2025-08-28 | End: 2025-08-28

## 2025-08-28 RX ADMIN — IOHEXOL 100 ML: 350 INJECTION, SOLUTION INTRAVENOUS at 08:08

## 2025-08-29 ENCOUNTER — TELEPHONE (OUTPATIENT)
Dept: VASCULAR SURGERY | Facility: CLINIC | Age: 80
End: 2025-08-29
Payer: MEDICARE